# Patient Record
Sex: FEMALE | Race: BLACK OR AFRICAN AMERICAN | URBAN - METROPOLITAN AREA
[De-identification: names, ages, dates, MRNs, and addresses within clinical notes are randomized per-mention and may not be internally consistent; named-entity substitution may affect disease eponyms.]

---

## 2021-04-30 ENCOUNTER — OFFICE VISIT (OUTPATIENT)
Dept: ORTHOPEDIC SURGERY | Age: 36
End: 2021-04-30
Payer: MEDICARE

## 2021-04-30 VITALS — HEIGHT: 68 IN | BODY MASS INDEX: 30.31 KG/M2 | WEIGHT: 200 LBS

## 2021-04-30 DIAGNOSIS — M19.072 ARTHRITIS OF LEFT ANKLE: Primary | ICD-10-CM

## 2021-04-30 DIAGNOSIS — S93.402A SPRAIN OF LEFT ANKLE, UNSPECIFIED LIGAMENT, INITIAL ENCOUNTER: ICD-10-CM

## 2021-04-30 PROCEDURE — 99203 OFFICE O/P NEW LOW 30 MIN: CPT | Performed by: ORTHOPAEDIC SURGERY

## 2021-04-30 PROCEDURE — G8417 CALC BMI ABV UP PARAM F/U: HCPCS | Performed by: ORTHOPAEDIC SURGERY

## 2021-04-30 PROCEDURE — G8510 SCR DEP NEG, NO PLAN REQD: HCPCS | Performed by: ORTHOPAEDIC SURGERY

## 2021-04-30 PROCEDURE — G8427 DOCREV CUR MEDS BY ELIG CLIN: HCPCS | Performed by: ORTHOPAEDIC SURGERY

## 2021-04-30 PROCEDURE — 20606 DRAIN/INJ JOINT/BURSA W/US: CPT | Performed by: ORTHOPAEDIC SURGERY

## 2021-04-30 RX ORDER — LIDOCAINE HYDROCHLORIDE 10 MG/ML
1 INJECTION INFILTRATION; PERINEURAL ONCE
Status: COMPLETED | OUTPATIENT
Start: 2021-04-30 | End: 2021-04-30

## 2021-04-30 RX ORDER — CYANOCOBALAMIN (VITAMIN B-12) 500 MCG
1 TABLET ORAL DAILY
COMMUNITY

## 2021-04-30 RX ORDER — RISPERIDONE 1 MG/1
TABLET, FILM COATED ORAL
COMMUNITY
Start: 2021-04-28

## 2021-04-30 RX ORDER — TRIAMCINOLONE ACETONIDE 40 MG/ML
40 INJECTION, SUSPENSION INTRA-ARTICULAR; INTRAMUSCULAR ONCE
Status: COMPLETED | OUTPATIENT
Start: 2021-04-30 | End: 2021-04-30

## 2021-04-30 RX ORDER — DICLOFENAC SODIUM 10 MG/G
GEL TOPICAL 4 TIMES DAILY
Qty: 100 G | Refills: 5 | Status: SHIPPED | OUTPATIENT
Start: 2021-04-30

## 2021-04-30 RX ORDER — CLONIDINE HYDROCHLORIDE 0.1 MG/1
TABLET ORAL
COMMUNITY
Start: 2021-04-28

## 2021-04-30 RX ADMIN — LIDOCAINE HYDROCHLORIDE 1 ML: 10 INJECTION INFILTRATION; PERINEURAL at 15:34

## 2021-04-30 RX ADMIN — TRIAMCINOLONE ACETONIDE 40 MG: 40 INJECTION, SUSPENSION INTRA-ARTICULAR; INTRAMUSCULAR at 15:35

## 2021-04-30 NOTE — LETTER
Brendon Oh 1985  
[id number]  
 
 
4/30/2021 I hereby authorize and direct Yeison Riggins MD, Jammie Cramer, and whomever he may designate as his associate to perform upon myself the following procedure: 
 
Injection of: Kenalog, Supartz, Euflexxa, Orthovisc in the Right/Left ____________________. If any unforeseen condition arises in the course of the procedure, I further authorize him and his associated and/or assistant(s) to do whatever he/she deems advisable. The nature, purpose, benefits, risks, side effects, likelihood of achieving goals, and potential problems that might occur during recuperation, risks for not receiving the proposed care, treatment and services and alternatives of the procedure have been fully explained to me by my physician including, but not limited to: 
 
Swelling, joint pain, skin pigment changes, worsening of condition, and failure to improve. I acknowledge that no guarantee or assurance has been made to me as to the results that may be obtained or the likelihood of success. _______________________________________ Signature of patient or authorized representative [employer] [facility] fax: [phone]

## 2021-04-30 NOTE — PROGRESS NOTES
Name: William Swain    : 1985     Service Dept: [address] [place] [address] Orthopaedics and Sports Medicine    Patient's Pharmacies:    [id number] [address] AT [place] [address] [place] [address]  Phone: [phone] Fax: [phone]       Chief Complaint   Patient presents with    Ankle Pain      Visit Vitals  Ht 5' 8\" (1.727 m)   Wt 200 lb (90.7 kg)   BMI 30.41 kg/m²      No Known Allergies   Current Outpatient Medications   Medication Sig Dispense Refill    cloNIDine HCL (CATAPRES) 0.1 mg tablet       folic acid 0.8 mg cap Take 1 Tab by mouth daily.  levonorgestreL (MIRENA) 20 mcg/24 hours (6 yrs) 52 mg IUD by IntraUTERine route.  risperiDONE (RisperDAL) 1 mg tablet         There is no problem list on file for this patient. History reviewed. No pertinent family history. Social History     Socioeconomic History    Marital status: UNKNOWN     Spouse name: Not on file    Number of children: Not on file    Years of education: Not on file    Highest education level: Not on file   Tobacco Use    Smoking status: Current Every Day Smoker    Smokeless tobacco: Never Used   Substance and Sexual Activity    Alcohol use: Yes      History reviewed. No pertinent surgical history. History reviewed. No pertinent past medical history. I have reviewed and agree with [address] [place] [address] Nw and ROS and intake form in chart and the record furthermore I have reviewed prior medical record(s) regarding this patients care during this appointment. Review of Systems:   Patient is a pleasant appearing individual, appropriately dressed, well hydrated, well nourished, who is alert, appropriately oriented for age, and in no acute distress with a normal gait and normal affect who does not appear to be in any significant pain.      Physical Exam:  Left Ankle-Point tenderness to palpation lateral aspect on ankle, Decreased range of motion with flexion and extension, No gross instability, Weakness with plantar flexion, No skin abrasions, Positive for swelling, Grossly neurovascularly intact. Right Ankle- No point tenderness, Full range of motion, No instability, No Weakness, No, skin lesions, No swelling, Grossly neurovascularly intact. Procedure Documentation:    I discussed in detail the risks, benefits and complications of an injection which included but are not limited to infection, skin reactions, hot swollen joint, and anaphylaxis with the patient. The patient verbalized understanding and gave informed consent for the injection. The patient's left ankle was prepped using sterile alcohol solution. A sterile needle was inserted into the left ankle and the mixture of 1 mL Lidocaine 1%, 1 mL Kenalog 40 mg was injected under sterile technique. The needle was withdrawn and the puncture site sealed with a Band-Aid. Technique: Under sterile conditions a [employer] ultrasound unit with a variable frequency (7.0-14.0 MHz) linear transducer was used to localize the placement of needle into the left ankle joint. Findings: Successful needle placement for ankle injection. Final images were taken and saved for permanent record. The patient tolerated the injection well. The patient was instructed to call the office immediately if there is any pain, redness, warmth, fever, or chills. Encounter Diagnoses     ICD-10-CM ICD-9-CM   1. Arthritis of left ankle  M19.072 716.97   2. Sprain of left ankle, unspecified ligament, initial encounter  S93.402A 845.00       HPI:  The patient is here with a chief complaint of left ankle pain, dull throbbing pain, progressively getting worse. Pain is 10/10. ROS:  10-point review of systems is unremarkable. X-rays are positive for left ankle osteoarthritis (OA) diagnosed with osteoarthritis (OA). Assessment/Plan:  Plan will be for cortisone injection left ankle. If it helps that's all we need to do.   We will see her back in a week and we will give her Voltaren gel as well. As part of continued conservative pain management options the patient was advised to utilize Tylenol or OTC NSAIDS as long as it is not medically contraindicated. Return to Office: Follow-up and Dispositions    · Return in about 1 week (around 5/7/2021). Scribed by Anuradha Modi MD as dictated by [facility]. Josefina Arellano MD.  Documentation True and Accepted Yeison Arellano MD

## 2021-04-30 NOTE — PATIENT INSTRUCTIONS
Ankle Sprain: Care Instructions  Your Care Instructions     An ankle sprain can happen when you twist your ankle. The ligaments that support the ankle can get stretched and torn. Often the ankle is swollen and painful. Ankle sprains may take from several weeks to several months to heal. Usually, the more pain and swelling you have, the more severe your ankle sprain is and the longer it will take to heal. You can heal faster and regain strength in your ankle with good home treatment. It is very important to give your ankle time to heal completely, so that you do not easily hurt your ankle again. Follow-up care is a key part of your treatment and safety. Be sure to make and go to all appointments, and call your doctor if you are having problems. It's also a good idea to know your test results and keep a list of the medicines you take. How can you care for yourself at home? · Prop up your foot on pillows as much as possible for the next 3 days. Try to keep your ankle above the level of your heart. This will help reduce the swelling. · Follow your doctor's directions for wearing a splint or elastic bandage. Wrapping the ankle may help reduce or prevent swelling. · Your doctor may give you a splint, a brace, an air stirrup, or another form of ankle support to protect your ankle until it is healed. Wear it as directed while your ankle is healing. Do not remove it unless your doctor tells you to. After your ankle has healed, ask your doctor whether you should wear the brace when you exercise. · Put ice or cold packs on your injured ankle for 10 to 20 minutes at a time. Try to do this every 1 to 2 hours for the next 3 days (when you are awake) or until the swelling goes down. Put a thin cloth between the ice and your skin. · You may need to use crutches until you can walk without pain. If you do use crutches, try to bear some weight on your injured ankle if you can do so without pain.  This helps the ankle heal.  · Take pain medicines exactly as directed. ? If the doctor gave you a prescription medicine for pain, take it as prescribed. ? If you are not taking a prescription pain medicine, ask your doctor if you can take an over-the-counter medicine. · If you have been given ankle exercises to do at home, do them exactly as instructed. These can promote healing and help prevent lasting weakness. When should you call for help? Call your doctor now or seek immediate medical care if:    · Your pain is getting worse.     · Your swelling is getting worse.     · Your splint feels too tight or you are unable to loosen it. Watch closely for changes in your health, and be sure to contact your doctor if:    · You are not getting better after 1 week. Where can you learn more? Go to http://www.gray.com/  Enter U718 in the search box to learn more about \"Ankle Sprain: Care Instructions. \"  Current as of: November 16, 2020               Content Version: 12.8  © 2006-2021 Healthwise, Incorporated. Care instructions adapted under license by [employer] (which disclaims liability or warranty for this information). If you have questions about a medical condition or this instruction, always ask your healthcare professional. [place] [address] any warranty or liability for your use of this information.

## 2021-05-28 ENCOUNTER — OFFICE VISIT (OUTPATIENT)
Dept: ORTHOPEDIC SURGERY | Age: 36
End: 2021-05-28
Payer: MEDICARE

## 2021-05-28 DIAGNOSIS — M25.441 SWELLING OF JOINT OF RIGHT HAND: Primary | ICD-10-CM

## 2021-05-28 PROCEDURE — G8417 CALC BMI ABV UP PARAM F/U: HCPCS | Performed by: ORTHOPAEDIC SURGERY

## 2021-05-28 PROCEDURE — G8427 DOCREV CUR MEDS BY ELIG CLIN: HCPCS | Performed by: ORTHOPAEDIC SURGERY

## 2021-05-28 PROCEDURE — G8432 DEP SCR NOT DOC, RNG: HCPCS | Performed by: ORTHOPAEDIC SURGERY

## 2021-05-28 PROCEDURE — 99213 OFFICE O/P EST LOW 20 MIN: CPT | Performed by: ORTHOPAEDIC SURGERY
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
## 2021-05-28 NOTE — PATIENT INSTRUCTIONS

## 2021-09-27 RX ORDER — METRONIDAZOLE 500 MG/1
TABLET ORAL
COMMUNITY
Start: 2021-06-30

## 2021-10-01 ENCOUNTER — OFFICE VISIT (OUTPATIENT)
Dept: ORTHOPEDIC SURGERY | Age: 36
End: 2021-10-01
Payer: MEDICARE

## 2021-10-01 DIAGNOSIS — M19.072 ARTHRITIS OF LEFT ANKLE: ICD-10-CM

## 2021-10-01 DIAGNOSIS — M25.572 LEFT ANKLE PAIN, UNSPECIFIED CHRONICITY: Primary | ICD-10-CM

## 2021-10-01 PROCEDURE — G8427 DOCREV CUR MEDS BY ELIG CLIN: HCPCS | Performed by: ORTHOPAEDIC SURGERY

## 2021-10-01 PROCEDURE — G8432 DEP SCR NOT DOC, RNG: HCPCS | Performed by: ORTHOPAEDIC SURGERY

## 2021-10-01 PROCEDURE — 20606 DRAIN/INJ JOINT/BURSA W/US: CPT | Performed by: ORTHOPAEDIC SURGERY

## 2021-10-01 PROCEDURE — 99214 OFFICE O/P EST MOD 30 MIN: CPT | Performed by: ORTHOPAEDIC SURGERY

## 2021-10-01 PROCEDURE — G8417 CALC BMI ABV UP PARAM F/U: HCPCS | Performed by: ORTHOPAEDIC SURGERY

## 2021-10-01 RX ORDER — LIDOCAINE HYDROCHLORIDE 10 MG/ML
1 INJECTION INFILTRATION; PERINEURAL ONCE
Status: COMPLETED | OUTPATIENT
Start: 2021-10-01 | End: 2021-10-01

## 2021-10-01 RX ORDER — TRIAMCINOLONE ACETONIDE 40 MG/ML
40 INJECTION, SUSPENSION INTRA-ARTICULAR; INTRAMUSCULAR ONCE
Status: COMPLETED | OUTPATIENT
Start: 2021-10-01 | End: 2021-10-01

## 2021-10-01 RX ADMIN — TRIAMCINOLONE ACETONIDE 40 MG: 40 INJECTION, SUSPENSION INTRA-ARTICULAR; INTRAMUSCULAR at 14:00

## 2021-10-01 RX ADMIN — LIDOCAINE HYDROCHLORIDE 1 ML: 10 INJECTION INFILTRATION; PERINEURAL at 14:00

## 2021-10-01 NOTE — PATIENT INSTRUCTIONS
Arthritis: Care Instructions  Overview     Arthritis, also called osteoarthritis, is a breakdown of the cartilage that cushions your joints. When the cartilage wears down, your bones rub against each other. This causes pain and stiffness. Many people have some arthritis as they age. Arthritis most often affects the joints of the spine, hands, hips, knees, or feet. Arthritis never goes away completely. But medicine and home treatment can help reduce pain and help you stay active. Follow-up care is a key part of your treatment and safety. Be sure to make and go to all appointments, and call your doctor if you are having problems. It's also a good idea to know your test results and keep a list of the medicines you take. How can you care for yourself at home? · Stay at a healthy weight. Being overweight puts extra strain on your joints. · Talk to your doctor or physical therapist about exercises that will help ease joint pain. ? Stretch. You may enjoy gentle forms of yoga to help keep your joints and muscles flexible. ? Walk instead of jog. Other types of exercise that are less stressful on the joints include riding a bike, swimming, sanchez chi, or water exercise. ? Lift weights. Strong muscles help reduce stress on your joints. Stronger thigh muscles, for example, take some of the stress off of the knees and hips. Learn the right way to lift weights so you don't make joint pain worse. · Take your medicines exactly as prescribed. Call your doctor if you think you are having a problem with your medicine. · Take pain medicines exactly as directed. ? If the doctor gave you a prescription medicine for pain, take it as prescribed. ? If you are not taking a prescription pain medicine, ask your doctor if you can take an over-the-counter medicine. · Use a cane, crutch, walker, or another device if you need help to get around. These can help rest your joints.  You also can use other things to make life easier, such as a higher toilet seat and padded handles on kitchen utensils. · Do not sit in low chairs. They can make it hard to get up. · Put heat or cold on your sore joints as needed. Use whichever helps you most. You can also switch between hot and cold packs. ? Apply heat 2 or 3 times a day for 20 to 30 minutes--using a heating pad, hot shower, or hot pack--to relieve pain and stiffness. But don't use heat on a swollen joint. ? Put ice or a cold pack on your sore joint for 10 to 20 minutes at a time. Put a thin cloth between the ice and your skin. When should you call for help? Call your doctor now or seek immediate medical care if:    · You have sudden swelling, warmth, or pain in any joint.     · You have joint pain and a fever or rash.     · You have such bad pain that you cannot use a joint. Watch closely for changes in your health, and be sure to contact your doctor if:    · You have mild joint symptoms that continue even with more than 6 weeks of care at home.     · You have stomach pain or other problems with your medicine. Where can you learn more? Go to http://www.gray.com/  Enter D853 in the search box to learn more about \"Arthritis: Care Instructions. \"  Current as of: April 30, 2021               Content Version: 13.0  © 0392-6584 Healthwise, Incorporated. Care instructions adapted under license by [employer] (which disclaims liability or warranty for this information). If you have questions about a medical condition or this instruction, always ask your healthcare professional. [place] [address] any warranty or liability for your use of this information.

## 2021-10-01 NOTE — LETTER
Carol Severe   1985   [id number]       10/1/2021       I hereby authorize and direct Yeison Viveros MD, Jennifer Voss, and whomever he may designate as his associate to perform upon myself the following procedure:    Injection of: Kenalog, Supartz, Euflexxa, Orthovisc in the Right/Left ____________________. If any unforeseen condition arises in the course of the procedure, I further authorize him and his associated and/or assistant(s) to do whatever he/she deems advisable. The nature, purpose, benefits, risks, side effects, likelihood of achieving goals, and potential problems that might occur during recuperation, risks for not receiving the proposed care, treatment and services and alternatives of the procedure have been fully explained to me by my physician including, but not limited to:    Swelling, joint pain, skin pigment changes, worsening of condition, and failure to improve. I acknowledge that no guarantee or assurance has been made to me as to the results that may be obtained or the likelihood of success.                 _______________________________________     Signature of patient or authorized representative                [employer] [facility] fax: [phone]

## 2021-10-01 NOTE — LETTER
10/4/2021    Patient: Margi Duff   YOB: 1985   Date of Visit: 10/1/2021     Veronika Winn MD  [address]  [address] Fax: [phone]    Dear Veronika Winn MD,      Thank you for referring Ms. Margi Duff to [address] [place] [address] [facility] for evaluation. My notes for this consultation are attached. If you have questions, please do not hesitate to call me. I look forward to following your patient along with you.       Sincerely,    Jorge Sutton MD

## 2021-10-01 NOTE — PROGRESS NOTES
Name: Macria Mohamud    : 1985     Service Dept: [address] [place] [address] and Sports Medicine    Patient's Pharmacies:    [place] [address] [id number] [place] [address] [place] [address]  Phone: [phone] Fax: [phone]       Chief Complaint   Patient presents with    Ankle Pain        There were no vitals taken for this visit. No Known Allergies   Current Outpatient Medications   Medication Sig Dispense Refill    metroNIDAZOLE (FLAGYL) 500 mg tablet TAKE 1 TABLET BY MOUTH EVERY 12 HOURS FOR 6 DAYS      cloNIDine HCL (CATAPRES) 0.1 mg tablet       folic acid 0.8 mg cap Take 1 Tab by mouth daily.  levonorgestreL (MIRENA) 20 mcg/24 hours (6 yrs) 52 mg IUD by IntraUTERine route.  risperiDONE (RisperDAL) 1 mg tablet       diclofenac (VOLTAREN) 1 % gel Apply  to affected area four (4) times daily. 100 g 5     Current Facility-Administered Medications   Medication Dose Route Frequency Provider Last Rate Last Admin    [COMPLETED] triamcinolone acetonide (KENALOG-40) 40 mg/mL injection 40 mg  40 mg Other ONCE Yeison López MD   40 mg at 10/01/21 1400    [COMPLETED] lidocaine (XYLOCAINE) 10 mg/mL (1 %) injection 1 mL  1 mL Other ONCE Yeison López MD   1 mL at 10/01/21 1400      There is no problem list on file for this patient.      Family History   Problem Relation Age of Onset    No Known Problems Mother     No Known Problems Father       Social History     Socioeconomic History    Marital status: UNKNOWN     Spouse name: Not on file    Number of children: Not on file    Years of education: Not on file    Highest education level: Not on file   Tobacco Use    Smoking status: Current Every Day Smoker    Smokeless tobacco: Never Used   Substance and Sexual Activity    Alcohol use: Yes     Social Determinants of Health     Financial Resource Strain:     Difficulty of Paying Living Expenses: Food Insecurity:     Worried About Running Out of Food in the Last Year:     [address] in the Last Year:    Transportation Needs:     Lack of Transportation (Medical):  Lack of Transportation (Non-Medical):    Physical Activity:     Days of Exercise per Week:     Minutes of Exercise per Session:    Stress:     Feeling of Stress :    Social Connections:     Frequency of Communication with Friends and Family:     Frequency of Social Gatherings with Friends and Family:     Attends [employer] Services:     Active Member of Clubs or Organizations:     Attends Club or Organization Meetings:     Marital Status:       History reviewed. No pertinent surgical history. History reviewed. No pertinent past medical history. I have reviewed and agree with [address] [place] [address] Nw and ROS and intake form in chart and the record furthermore I have reviewed prior medical record(s) regarding this patients care during this appointment. Review of Systems:   Patient is a pleasant appearing individual, appropriately dressed, well hydrated, well nourished, who is alert, appropriately oriented for age, and in no acute distress with a normal gait and normal affect who does not appear to be in any significant pain. Physical Exam:  Left Ankle- Grossly neurovascularly intact with good cap refill, decreased range of motion, decreased strength, positive crepitation, minimal swelling, skin intact with no skin lesions, no erythema, no echymosis, no point tenderness. Right Ankle - No point tenderness, Full range of motion, No instability, No Weakness, No, skin lesions, No swelling, No instability, Grossly neurovascularly intact. Procedure Documentation:    I discussed in detail the risks, benefits and complications of an injection which included but are not limited to infection, skin reactions, hot swollen joint, and anaphylaxis with the patient. The patient verbalized understanding and gave informed consent for the injection. The patient's left ankle was prepped using sterile alcohol solution. A sterile needle was inserted into the left ankle and the mixture of 1 mL Lidocaine 1%, 1 mL Kenalog 40 mg was injected under sterile technique. The needle was withdrawn and the puncture site sealed with a Band-Aid. Technique: Under sterile conditions a [employer] ultrasound unit with a variable frequency (7.0-14.0 MHz) linear transducer was used to localize the placement of needle into the left ankle joint. Findings: Successful needle placement for ankle injection. Final images were taken and saved for permanent record. The patient tolerated the injection well. The patient was instructed to call the office immediately if there is any pain, redness, warmth, fever, or chills. Encounter Diagnoses     ICD-10-CM ICD-9-CM   1. Left ankle pain, unspecified chronicity  M25.572 719.47   2. Arthritis of left ankle  M19.072 716.97       HPI:  The patient is here with a chief complaint of left ankle pain, diagnosed with osteoarthritis, post injection in the past but nothing recently. Pain is 9/10. Continues to have difficulty. Assessment/Plan:  1. Left ankle arthritis that is not getting better. Plan will be for cortisone injection to the left ankle. If it helps, that is all we need to do. As part of continued conservative pain management options the patient was advised to utilize Tylenol or OTC NSAIDS as long as it is not medically contraindicated. Return to Office: Follow-up and Dispositions    · Return in about 4 weeks (around 10/29/2021).         Administrations This Visit     lidocaine (XYLOCAINE) 10 mg/mL (1 %) injection 1 mL     Admin Date  10/01/2021 Action  Given Dose  1 mL Route  Other Administered By  Emily Onofre LPN          triamcinolone acetonide (KENALOG-40) 40 mg/mL injection 40 mg     Admin Date  10/01/2021 Action  Given Dose  40 mg Route  Other Administered By  Emily Onofre LPN               Scribed by Lieutenant Mari LPN as dictated by Thomas Mccann. Timothy Topete MD.  Documentation True and Accepted Yeison Topete MD

## 2021-11-05 ENCOUNTER — OFFICE VISIT (OUTPATIENT)
Dept: ORTHOPEDIC SURGERY | Age: 36
End: 2021-11-05
Payer: MEDICARE

## 2021-11-05 DIAGNOSIS — M25.572 LEFT ANKLE PAIN, UNSPECIFIED CHRONICITY: Primary | ICD-10-CM

## 2021-11-05 PROCEDURE — G8432 DEP SCR NOT DOC, RNG: HCPCS | Performed by: ORTHOPAEDIC SURGERY

## 2021-11-05 PROCEDURE — 99213 OFFICE O/P EST LOW 20 MIN: CPT | Performed by: ORTHOPAEDIC SURGERY

## 2021-11-05 PROCEDURE — G8417 CALC BMI ABV UP PARAM F/U: HCPCS | Performed by: ORTHOPAEDIC SURGERY

## 2021-11-05 PROCEDURE — G8427 DOCREV CUR MEDS BY ELIG CLIN: HCPCS | Performed by: ORTHOPAEDIC SURGERY

## 2021-11-05 NOTE — LETTER
11/8/2021    Patient: Nancy Ambriz   YOB: 1985   Date of Visit: 11/5/2021     Tiffanie Alatorre MD  [address]  V[address] Fax: [phone]    Dear Tiffanie Alatorre MD,      Thank you for referring Ms. Nancy Ambriz to [address] [place] [address] for evaluation. My notes for this consultation are attached. If you have questions, please do not hesitate to call me. I look forward to following your patient along with you.       Sincerely,    Clyde Hutton MD

## 2021-11-05 NOTE — PROGRESS NOTES
Name: Gifty Robb    : 1985     Service Dept: [address] [place] [address] and Sports Medicine    Patient's Pharmacies:    [id number] - [address] AT [place] [address] [place] [address]  Phone: [phone] Fax: [phone]       Chief Complaint   Patient presents with    Ankle Pain        There were no vitals taken for this visit. No Known Allergies   Current Outpatient Medications   Medication Sig Dispense Refill    metroNIDAZOLE (FLAGYL) 500 mg tablet TAKE 1 TABLET BY MOUTH EVERY 12 HOURS FOR 6 DAYS      cloNIDine HCL (CATAPRES) 0.1 mg tablet       folic acid 0.8 mg cap Take 1 Tab by mouth daily.  levonorgestreL (MIRENA) 20 mcg/24 hours (6 yrs) 52 mg IUD by IntraUTERine route.  risperiDONE (RisperDAL) 1 mg tablet       diclofenac (VOLTAREN) 1 % gel Apply  to affected area four (4) times daily. 100 g 5      There is no problem list on file for this patient. Family History   Problem Relation Age of Onset    No Known Problems Mother     No Known Problems Father       Social History     Socioeconomic History    Marital status: UNKNOWN   Tobacco Use    Smoking status: Current Every Day Smoker    Smokeless tobacco: Never Used   Substance and Sexual Activity    Alcohol use: Yes      History reviewed. No pertinent surgical history. History reviewed. No pertinent past medical history. I have reviewed and agree with [address] [facility] [address] and ROS and intake form in chart and the record furthermore I have reviewed prior medical record(s) regarding this patients care during this appointment. Review of Systems:   Patient is a pleasant appearing individual, appropriately dressed, well hydrated, well nourished, who is alert, appropriately oriented for age, and in no acute distress with a normal gait and normal affect who does not appear to be in any significant pain.    Physical Exam:  Left Ankle- Grossly neurovascularly intact with good cap refill, decreased range of motion, decreased strength, positive crepitation, minimal swelling, skin intact with no skin lesions, no erythema, no echymosis, no point tenderness. Right Ankle - No point tenderness, Full range of motion, No instability, No Weakness, No, skin lesions, No swelling, No instability, Grossly neurovascularly intact. Encounter Diagnoses     ICD-10-CM ICD-9-CM   1. Left ankle pain, unspecified chronicity  M25.572 719.47       HPI:  The patient is here with a chief complaint of left tibial harshil placement, diagnosed with osteoarthritis, progressively getting worse. Pain is 9/10. Assessment/Plan:  Plan at this point, my recommendation would be for foot and ankle specialist for ankle OA. We will see the patient back as needed and go from there. No restrictions in the meantime. As part of continued conservative pain management options the patient was advised to utilize Tylenol or OTC NSAIDS as long as it is not medically contraindicated. Return to Office: Follow-up and Dispositions    · Return if symptoms worsen or fail to improve. Scribed by Tiffany Rodríguez LPN as dictated by [facility] RADHA Shah MD.  Documentation True and Accepted Yeison Shah MD

## 2021-11-05 NOTE — PATIENT INSTRUCTIONS
Ankle: Exercises  Introduction  Here are some examples of exercises for you to try. The exercises may be suggested for a condition or for rehabilitation. Start each exercise slowly. Ease off the exercises if you start to have pain. You will be told when to start these exercises and which ones will work best for you. How to do the exercises  'Alphabet' exercise    1. Trace the alphabet with your toe. This helps your ankle move in all directions. Side-to-side knee swing exercise    1. Sit in a chair with your foot flat on the floor. 2. Slowly move your knee from side to side while keeping your foot pressed flat. 3. Continue this exercise for 2 to 3 minutes. Towel curl    1. While sitting, place your foot on a towel on the floor and scrunch the towel toward you with your toes. 2. Then use your toes to push the towel away from you. 3. Make this exercise more challenging by placing a weighted object, such as a soup can, on the other end of the towel. Towel stretch    1. Sit with your legs extended and knees straight. 2. Place a towel around your foot just under the toes. 3. Hold each end of the towel in each hand, with your hands above your knees. 4. Pull back with the towel so that your foot stretches toward you. 5. Hold the position for at least 15 to 30 seconds. 6. Repeat 2 to 4 times a session, up to 5 sessions a day. Ankle eversion exercise    1. Start by sitting with your foot flat on the floor and pushing it outward against an immovable object such as the wall or heavy furniture. Hold for about 6 seconds, then relax. Repeat 8 to 12 times. 2. After you feel comfortable with this, try using rubber tubing looped around the outside of your feet for resistance. Push your foot out to the side against the tubing, and then count to 10 as you slowly bring your foot back to the middle. Repeat 8 to 12 times. Isometric opposition exercises    1.  While sitting, put your feet together flat on the floor.  2. Press your injured foot inward against your other foot. Hold for about 6 seconds, and relax. Repeat 8 to 12 times. 3. Then place the heel of your other foot on top of the injured one. Push down with the top heel while trying to push up with your injured foot. Hold for about 6 seconds, and relax. Repeat 8 to 12 times. Follow-up care is a key part of your treatment and safety. Be sure to make and go to all appointments, and call your doctor if you are having problems. It's also a good idea to know your test results and keep a list of the medicines you take. Where can you learn more? Go to http://www.gray.com/  Enter R730 in the search box to learn more about \"Ankle: Exercises. \"  Current as of: July 1, 2021               Content Version: 13.0  © 4400-9574 [employer]. Care instructions adapted under license by [employer] (which disclaims liability or warranty for this information). If you have questions about a medical condition or this instruction, always ask your healthcare professional. [address] any warranty or liability for your use of this information.

## 2021-11-29 ENCOUNTER — OFFICE VISIT (OUTPATIENT)
Dept: ORTHOPEDIC SURGERY | Age: 36
End: 2021-11-29
Payer: MEDICARE

## 2021-11-29 VITALS — TEMPERATURE: 97.6 F | BODY MASS INDEX: 30.41 KG/M2 | WEIGHT: 200 LBS

## 2021-11-29 DIAGNOSIS — M19.172 POST-TRAUMATIC OSTEOARTHRITIS OF LEFT ANKLE: Primary | ICD-10-CM

## 2021-11-29 DIAGNOSIS — M25.572 CHRONIC PAIN OF LEFT ANKLE: ICD-10-CM

## 2021-11-29 DIAGNOSIS — G89.29 CHRONIC PAIN OF LEFT ANKLE: ICD-10-CM

## 2021-11-29 PROCEDURE — 99214 OFFICE O/P EST MOD 30 MIN: CPT | Performed by: ORTHOPAEDIC SURGERY

## 2021-11-29 PROCEDURE — G8417 CALC BMI ABV UP PARAM F/U: HCPCS | Performed by: ORTHOPAEDIC SURGERY

## 2021-11-29 PROCEDURE — G8432 DEP SCR NOT DOC, RNG: HCPCS | Performed by: ORTHOPAEDIC SURGERY

## 2021-11-29 PROCEDURE — 73590 X-RAY EXAM OF LOWER LEG: CPT | Performed by: ORTHOPAEDIC SURGERY

## 2021-11-29 PROCEDURE — G8427 DOCREV CUR MEDS BY ELIG CLIN: HCPCS | Performed by: ORTHOPAEDIC SURGERY

## 2021-11-29 RX ORDER — MELOXICAM 15 MG/1
15 TABLET ORAL DAILY
Qty: 30 TABLET | Refills: 1 | Status: SHIPPED | OUTPATIENT
Start: 2021-11-29 | End: 2022-01-01 | Stop reason: SDUPTHER

## 2021-11-29 RX ORDER — FAMOTIDINE 40 MG/1
40 TABLET, FILM COATED ORAL DAILY
Qty: 30 TABLET | Refills: 1 | Status: SHIPPED | OUTPATIENT
Start: 2021-11-29 | End: 2022-01-01 | Stop reason: SDUPTHER

## 2021-11-29 NOTE — PROGRESS NOTES
AMBULATORY PROGRESS NOTE      Patient: Sachin Hernandez             MRN: [id number]     SSN: [id number] Body mass index is 30.41 kg/m². YOB: 1985     AGE: 39 y.o. EX: female    PCP: Carri Blanton MD       IMPRESSION //  DIAGNOSIS AND TREATMENT PLAN        Sachin Hernandez has a diagnosis of:      She mentions having tenderness the medial ankle lateral ankle and proximal medial tibia region: There is no signs infection to this left lower extremity there is no gross instability left ankle, recommendation comes really posted orthotic to support the medial column of her hindfoot. I have her sign a medical release form to the physicians that were treating her, so I can decipher what else had been done to her. As she had intramedullary rodding of her left tibia and ORIF of the of the distal tibia as well with cannulated screws. She insists that the screws are in the wrong position and they are \"floating around\". No surgical intervention, this individual, at this current time. She had a cortisone injection, I believe by Dr. Vivian Mahan of [facility]// I do not think she needs any additional cortisone injections at this time. DIAGNOSES    1. Post-traumatic osteoarthritis of left ankle    2. Chronic pain of left ankle        Orders Placed This Encounter    Generic Supply Order     Custom Left SMO Medially posted    Generic Supply Order     Left AS/AC brace will be given and placed on patient    [[id number]] Tib/Fib 2V     Order Specific Question:   Reason for Exam     Answer:   pain     Order Specific Question:   Is Patient Pregnant? Answer:   Unknown    meloxicam (MOBIC) 15 mg tablet     Sig: Take 1 Tablet by mouth daily. Dispense:  30 Tablet     Refill:  1    famotidine (PEPCID) 40 mg tablet     Sig: Take 1 Tablet by mouth daily. Dispense:  30 Tablet     Refill:  1            PLAN:    1. Medical release form, to her treating physicians  2.   Medially posted, left SMO brace will be recommended to take some pressure off the medial portion of her left hindfoot: She understands insurance does not always cover the cost of this. Dacia Louis MD has ordered a custom brace or DME product for Noemi Perez . This will be customized and made for you by an outside facility. I am requesting that you contact the Orthotist company provided below in order to have the prescription made.  PROSTHETICS AND ORTHOTICS      Noemi Perez is in need of CUSTOM   Left     1. Unilateral,  , brace deviceorthoticleft newly posted SMO Bilateral:      2. GOAL OF TREATMENT TO: to provide M/L stability, optimal arch support, and limit ML/AP motion. Noemi Perez needs tri planar control (Medial / Lateral stability, optimal arch support, and limited Medial/Lateral // Anterior/Posterior Motion) of the foot and ankle in order to improve anatomical alignment, protect/control motion, and to relieve pain. 3. [facility] devices (ankle braces, over-the-counter ankle splint supports,) and each of these has been either ill fitting, poorly tolerated, provided incomplete support, provided insufficient  pain relief. Please look favorably upon Noemi Perez and please assist in covering the financial expense of the custom DME. Dacia Louis MD  11/29/2021  12:14 PM        RTO 4 to 6 weeks    There are no Patient Instructions on file for this visit. Please follow up with your PCP for any health maintenance as recommended         Noemi Perez  expresses understanding of the diagnosis, treatment plan, and all of their proposed questions were answered to their satisfaction. Patient education has been provided re the diagnoses.          \[facility] [place] [facility]\"" //  Kathryn Andrés IS A 39 y.o. female who is a/an  established patient, presenting to my outpatient office for evaluation of  the following chief complaint(s): Chief Complaint   Patient presents with    Ankle Pain     left        Justino Kruse been under the care of Dr. Daphne Galvin for which his first evaluation was on April 30, 2021 for which she presented with a chief complaint of left ankle pain and on his evaluation she had some tenderness to the lateral aspect of her ankle. A cortisone injection was performed by him, on that April 30, 2021 office visit date and he obtain x-rays of her ankle as well to confirm some osteoarthritis of her left ankle joint. On October 1, 2021, she presented again, for follow-up care, of her left ankle, and her evaluation at that time revealed decreased range of motion, decreased strength, positive crepitation minimal swelling, again a diagnosis of osteoarthritis of her left ankle. A cortisone injection, was performed on her left ankle, on that October 1, 2021 visit.     She had x-rays, and the [facility] network system, on April 30, 2021. These were x-rays of her left tibia and fibula. The impression was healed distal tibia and fibula diaphyseal fracture deformities with ORIF hardware in place. No acute radiographic abnormality. There is some mild degenerative subchondral cystic formation at the distal roof of the tibial plafond. Mild medial tibiofemoral compartment degenerative marginal osteophytic formation with preserved joint spaces however. Present today, complain of pain, to the lateral part of her ankle, and medial part of her ankle. The pain starts the medial part of her ankle, shoots up to the medial part of her knee. She tells me that and shows me x-rays that she had in the past.  She tells me that she has had at least 2 surgeries. They are done at [facility] she tells me that her case is currently under litigation, and she has a  and she spoke with a  this morning. She tells me that she had 2 surgeries at [facility].   1 surgery with a but the rodding, that she tells me another surgery where she had additional fixation of her ankle. She tells me that the distal screws, she points to her x-ray shows me that but that the distal screws in her tibia, are in the wrong position and they should be through this central hole of the intramedullary harshil. I have not reviewed any medical records, from [facility].  She needs to sign a medical release form for me to the look at the data from any other physician that she had seen in the past.      Visit Vitals  Temp 97.6 °F (36.4 °C)   Wt 200 lb (90.7 kg)   BMI 30.41 kg/m²       Appearance: Alert, well appearing and pleasant patient who is in no distress, oriented to person, place/time, and who follows commands. Normal dress/motor activity/thought processes/memory. This patient is accompanied in the examination room by her  self. Patient arrives to office via: without assistive device:      Psychiatric:  Normal Affect/mood. Judgement, behavior, and conduct are appropriate. Speech normal in context and clarity, memory intact grossly, no involuntary movements - tremors. H EENT (2): Head normocephalic & atraumatic. Eye: pupils are round// EOM are intact // Neck: ROM WNL  // Hearings Intact   Respiratory: Breathing non labored     ANKLE/FOOT left    Gait: normal  Tenderness: mild medial malleolus distal tip, mild, slight to the anterolateral gutter of the left ankle. Nontender to the proximal fibula. Negative external rotation stress test  Cutaneous:   WNL. Joint Motion:   WNL   Joint / Tendon Stability:  No Ankle or Subtalar instability or joint laxity. No peroneal sublux ability or dislocation  Alignment: neutral Hindfoot,    Neuro Motor/Sensory: NL/NL  Vascular: NL foot/ankle pulses,   Lymphatics: No extremity lymphedema, No calf swelling, no tenderness to calf muscles.           CHART REVIEW     Keo Joiner has been experiencing pain and discomfort confirmed as outlined in the pain assessment outlined below.     was reviewed by John Berg MD on 11/29/2021. Pain Assessment  11/29/2021   Location of Pain Ankle   Location Modifiers Left   Severity of Pain 10   Quality of Pain Dull;Aching   Frequency of Pain Constant   Aggravating Factors Stairs; Walking;Standing;Squatting   Limiting Behavior Yes   Relieving Factors Other (Comment)   Result of Injury Yes        Ariana Lomeli  has no past medical history on file. Patients is employed at:         History reviewed. No pertinent past medical history. Past Surgical History:   Procedure Laterality Date    HX ANKLE FRACTURE TX       Current Outpatient Medications   Medication Sig    meloxicam (MOBIC) 15 mg tablet Take 1 Tablet by mouth daily.  famotidine (PEPCID) 40 mg tablet Take 1 Tablet by mouth daily.  metroNIDAZOLE (FLAGYL) 500 mg tablet TAKE 1 TABLET BY MOUTH EVERY 12 HOURS FOR 6 DAYS    cloNIDine HCL (CATAPRES) 0.1 mg tablet     folic acid 0.8 mg cap Take 1 Tab by mouth daily.  levonorgestreL (MIRENA) 20 mcg/24 hours (6 yrs) 52 mg IUD by IntraUTERine route.  risperiDONE (RisperDAL) 1 mg tablet     diclofenac (VOLTAREN) 1 % gel Apply  to affected area four (4) times daily. No current facility-administered medications for this visit. No Known Allergies  Social History     Occupational History    Not on file   Tobacco Use    Smoking status: Current Every Day Smoker    Smokeless tobacco: Never Used   Substance and Sexual Activity    Alcohol use: Yes    Drug use: Not on file    Sexual activity: Not on file     Family History   Problem Relation Age of Onset    No Known Problems Mother     No Known Problems Father         DIAGNOSTIC LAB DATA      No results found for: HBA1C, QNY0UHII, MHJ3JUVU // No results found for: GLU, GLUCPOC     No results found for: IJM7KESQ, LAO8EGYM      No results found for: VITD3, XQVID2, XQVID3, XQVID, VD3RIA, [id number]     Drug Screen Most Recent Result Date    No resulted procedures found. REVIEW OF SYSTEMS : 11/29/2021  ALL BELOW ARE Negative except : SEE HPI     All other systems reviewed and are negative. 12 point review of systems otherwise negative unless noted in HPI. RADIOGRAPHS// IMAGING//DIAGNOSTIC DATA     Orders Placed This Encounter    Generic Supply Order    Generic Supply Order    [[id number]] Tib/Fib 2V    meloxicam (MOBIC) 15 mg tablet    famotidine (PEPCID) 40 mg tablet        X-rays of the tib-fib, left tib-fib, full-length: 2 views: AP, lateral: Shows a well-healed distal one third tibia fracture, with some OA changes seen across ankle. There is intramedullary harshil, there are also 2 screws distally, that traverse the distal tibia. Ankle joint space is slightly narrowed, these are nonweightbearing x-rays. Otherwise I see no osteolytic or osteoblastic lesions are seen to the ankle. The subtalar joint and transverse tarsal joints are well-maintained, on these images. I have personally reviewed the images of the above study. The interpretation of this study is my professional opinion            I have spent 35 minutes reviewing the previous notes, reviewing diagnostic studies [Advanced  Imaging, Diagnostic test results (x-rays)] and had a direct face to face with the patient discussing the diagnosis and importance of compliance with the treatment and plan. There is  discussion for the potential for surgery, answering all questions, as well as documenting patient care coordination for this individual on the day of the visit. Disclaimer:     Sections of this note are dictated using utilizing voice recognition software, which may have resulted in some phonetic based errors in grammar and contents. Even though attempts were made to correct all the mistakes, some may have been missed, and remained in the body of the document. If questions arise, please contact our department. An electronic signature was used to authenticate this note.     Sachin heredia have a reminder for a \"due or due soon\" health maintenance. I have asked that she contact her primary care provider for follow-up on this health maintenance. There are no Patient Instructions on file for this visit. Please follow up with your PCP for any health maintenance as recommended.                 Wyatt James MD  11/29/2021  7:49 AM

## 2021-11-30 ENCOUNTER — TELEPHONE (OUTPATIENT)
Dept: ORTHOPEDIC SURGERY | Age: 36
End: 2021-11-30

## 2021-11-30 DIAGNOSIS — M25.572 CHRONIC PAIN OF LEFT ANKLE: ICD-10-CM

## 2021-11-30 DIAGNOSIS — M19.172 POST-TRAUMATIC OSTEOARTHRITIS OF LEFT ANKLE: ICD-10-CM

## 2021-11-30 DIAGNOSIS — G89.29 CHRONIC PAIN OF LEFT ANKLE: ICD-10-CM

## 2021-11-30 RX ORDER — FAMOTIDINE 40 MG/1
40 TABLET, FILM COATED ORAL DAILY
Qty: 30 TABLET | Refills: 1 | Status: CANCELLED | OUTPATIENT
Start: 2021-11-30

## 2021-11-30 RX ORDER — MELOXICAM 15 MG/1
15 TABLET ORAL DAILY
Qty: 30 TABLET | Refills: 1 | Status: CANCELLED | OUTPATIENT
Start: 2021-11-30

## 2021-11-30 NOTE — TELEPHONE ENCOUNTER
Patient is requesting we resend the scripts for Mobic and Pepcid to the [facility] at [facility] and [facility] Mendieta in [facility] please.      Patient [phone]

## 2022-01-01 DIAGNOSIS — M19.172 POST-TRAUMATIC OSTEOARTHRITIS OF LEFT ANKLE: ICD-10-CM

## 2022-01-01 DIAGNOSIS — M25.572 CHRONIC PAIN OF LEFT ANKLE: ICD-10-CM

## 2022-01-01 DIAGNOSIS — G89.29 CHRONIC PAIN OF LEFT ANKLE: ICD-10-CM

## 2022-01-01 RX ORDER — FAMOTIDINE 40 MG/1
40 TABLET, FILM COATED ORAL DAILY
Qty: 90 TABLET | Refills: 2 | Status: SHIPPED | OUTPATIENT
Start: 2022-01-01

## 2022-01-01 RX ORDER — MELOXICAM 15 MG/1
15 TABLET ORAL DAILY
Qty: 30 TABLET | Refills: 2 | Status: SHIPPED | OUTPATIENT
Start: 2022-01-01 | End: 2022-01-31

## 2022-01-19 NOTE — PROGRESS NOTES
AMBULATORY PROGRESS NOTE      Patient: Ashlie cMneil             MRN: [id number]     SSN: [id number] Body mass index is 43.94 kg/m². YOB: 1985     AGE: 44 y.o. EX: female    PCP: Missy Overton MD       IMPRESSION //  DIAGNOSIS AND TREATMENT PLAN        Ashlie Mcneil has a diagnosis of:      DIAGNOSES    1. Post-traumatic osteoarthritis of left ankle        Orders Placed This Encounter    meloxicam (MOBIC) 15 mg tablet     Sig: Take 1 Tablet by mouth daily. Dispense:  90 Tablet     Refill:  0            PLAN:    1.  2-month follow-up  2. Activities as tolerated     RTO 2 months    There are no Patient Instructions on file for this visit. Please follow up with your PCP for any health maintenance as recommended         Ashlie Mcneil  expresses understanding of the diagnosis, treatment plan, and all of their proposed questions were answered to their satisfaction. Patient education has been provided re the diagnoses. HPI //  Kathryn 634 IS A 44 y.o. female who is a/an  established patient, presenting to my outpatient office for evaluation of  the following chief complaint(s):     Chief Complaint   Patient presents with    Knee Pain     Left knee pain    Ankle Pain     Left ankle pain        At [address] [facility] presented w/ post-traumatic osteoarthritis of left ankle. Medical release form, to her treating physicians. Medially posted, left SMO brace will be recommended to take some pressure off the medial. Order 2-View XR of left  Tib/Fib. Since LOV. She states she is doing a bit better, taking meloxicam for her pain. She still reports occasional sharp pain to the galea anterior portion of her left ankle and has some discomfort when she stands and walks for prolonged periods of time. Denies any additional history of trauma. She feels of \"the bones are shorter in her left foot and ankle, compared to the right\".     My last visit, my recommendation was for: A medially posted orthotic orthotic to support the medial column of her hindfoot. I recommended that she: sign a medical release form to the physicians that were treating her, so I can decipher what else had been done to her. As she had intramedullary rodding of her left tibia and ORIF of the of the distal tibia as well with cannulated screws. She insists that the screws are in the wrong position and they are \"floating around\". Not recommend any surgical intervention, this individual, at this/that current time. Visit Vitals  Temp 97.7 °F (36.5 °C) (Temporal)   Ht 5' 8\" (1.727 m)   Wt 289 lb (131.1 kg)   BMI 43.94 kg/m²       Appearance: Alert, well appearing and pleasant patient who is in no distress, oriented to person, place/time, and who follows commands. Normal dress/motor activity/thought processes/memory. This patient is accompanied in the examination room by her  self. Patient arrives to office via: without assistive device:      Psychiatric:  Normal Affect/mood. Judgement, behavior, and conduct are appropriate. Speech normal in context and clarity, memory intact grossly, no involuntary movements - tremors. H EENT (2): Head normocephalic & atraumatic. Eye: pupils are round// EOM are intact // Neck: ROM WNL  // Hearings Intact   Respiratory: Breathing non labored     ANKLE/FOOT left    Gait: normal  Tenderness: no    Cutaneous:    WNL. Joint Motion:   WNL   Joint / Tendon Stability:  No Ankle or Subtalar instability or joint laxity. No peroneal sublux ability or dislocation  Alignment: neutral Hindfoot,    Neuro Motor/Sensory: NL/NL  Vascular: NL foot/ankle pulses,   Lymphatics: No extremity lymphedema, No calf swelling, no tenderness to calf muscles. CHART REVIEW     Marco A Jenkins has been experiencing pain and discomfort confirmed as outlined in the pain assessment outlined below.      was reviewed Juliane Farris MD on 1/24/2022. Pain Assessment  11/29/2021   Location of Pain Ankle   Location Modifiers Left   Severity of Pain 10   Quality of Pain Dull;Aching   Frequency of Pain Constant   Aggravating Factors Stairs; Walking;Standing;Squatting   Limiting Behavior Yes   Relieving Factors Other (Comment)   Result of Injury Yes        Nargis Hogue  has no past medical history on file. Patients is employed at:         History reviewed. No pertinent past medical history. Past Surgical History:   Procedure Laterality Date    HX ANKLE FRACTURE TX       Current Outpatient Medications   Medication Sig    meloxicam (MOBIC) 15 mg tablet Take 1 Tablet by mouth daily.  meloxicam (MOBIC) 15 mg tablet TAKE 1 TABLET BY MOUTH DAILY    folic acid 0.8 mg cap Take 1 Tab by mouth daily.  levonorgestreL (MIRENA) 20 mcg/24 hours (6 yrs) 52 mg IUD by IntraUTERine route.  famotidine (PEPCID) 40 mg tablet TAKE 1 TABLET BY MOUTH DAILY (Patient not taking: Reported on 1/24/2022)    metroNIDAZOLE (FLAGYL) 500 mg tablet TAKE 1 TABLET BY MOUTH EVERY 12 HOURS FOR 6 DAYS (Patient not taking: Reported on 1/24/2022)    cloNIDine HCL (CATAPRES) 0.1 mg tablet  (Patient not taking: Reported on 1/24/2022)    risperiDONE (RisperDAL) 1 mg tablet  (Patient not taking: Reported on 1/24/2022)    diclofenac (VOLTAREN) 1 % gel Apply  to affected area four (4) times daily. (Patient not taking: Reported on 1/24/2022)     No current facility-administered medications for this visit. No Known Allergies  Social History     Occupational History    Not on file   Tobacco Use    Smoking status: Current Some Day Smoker    Smokeless tobacco: Never Used   Substance and Sexual Activity    Alcohol use:  Yes    Drug use: Not on file    Sexual activity: Not on file     Family History   Problem Relation Age of Onset    No Known Problems Mother     No Known Problems Father         DIAGNOSTIC LAB DATA      No results found for: HBA1C, QII1QAFC, QAX1AYGW // No results found for: GLU, GLUCPOC     No results found for: FUA7IRNW, PVI0NWEH      No results found for: VITD3, XQVID2, XQVID3, XQVID, VD3RIA, [id number]     Drug Screen Most Recent Result Date    No resulted procedures found. REVIEW OF SYSTEMS : 1/24/2022  ALL BELOW ARE Negative except : SEE HPI     All other systems reviewed and are negative. 12 point review of systems otherwise negative unless noted in HPI. RADIOGRAPHS// IMAGING//DIAGNOSTIC DATA     Orders Placed This Encounter    meloxicam (MOBIC) 15 mg tablet        I have personally reviewed the images of the above study. The interpretation of this study is my professional opinion            I have spent 30 minutes reviewing the previous notes, reviewing diagnostic studies [Advanced  Imaging, Diagnostic test results (x-rays)] and had a direct face to face with the patient discussing the diagnosis and importance of compliance with the treatment and plan. There is  discussion for the potential for surgery, answering all questions, as well as documenting patient care coordination for this individual on the day of the visit. Disclaimer:     Sections of this note are dictated using utilizing voice recognition software, which may have resulted in some phonetic based errors in grammar and contents. Even though attempts were made to correct all the mistakes, some may have been missed, and remained in the body of the document. If questions arise, please contact our department. An electronic signature was used to authenticate this note. Corine Fried may have a reminder for a \"due or due soon\" health maintenance. I have asked that she contact her primary care provider for follow-up on this health maintenance. There are no Patient Instructions on file for this visit. Please follow up with your PCP for any health maintenance as recommended.               Juliane Dunham, as dictated by, Chris Arana  1/24/2022  4:37 PM

## 2022-01-24 ENCOUNTER — OFFICE VISIT (OUTPATIENT)
Dept: ORTHOPEDIC SURGERY | Age: 37
End: 2022-01-24
Payer: MEDICARE

## 2022-01-24 VITALS — WEIGHT: 289 LBS | HEIGHT: 68 IN | BODY MASS INDEX: 43.8 KG/M2 | TEMPERATURE: 97.7 F

## 2022-01-24 DIAGNOSIS — M19.172 POST-TRAUMATIC OSTEOARTHRITIS OF LEFT ANKLE: Primary | ICD-10-CM

## 2022-01-24 PROCEDURE — G8432 DEP SCR NOT DOC, RNG: HCPCS | Performed by: ORTHOPAEDIC SURGERY

## 2022-01-24 PROCEDURE — G8417 CALC BMI ABV UP PARAM F/U: HCPCS | Performed by: ORTHOPAEDIC SURGERY

## 2022-01-24 PROCEDURE — G8427 DOCREV CUR MEDS BY ELIG CLIN: HCPCS | Performed by: ORTHOPAEDIC SURGERY

## 2022-01-24 PROCEDURE — 99214 OFFICE O/P EST MOD 30 MIN: CPT | Performed by: ORTHOPAEDIC SURGERY

## 2022-01-24 RX ORDER — MELOXICAM 15 MG/1
15 TABLET ORAL DAILY
Qty: 90 TABLET | Refills: 0 | Status: SHIPPED | OUTPATIENT
Start: 2022-01-24

## 2022-03-21 ENCOUNTER — TELEPHONE (OUTPATIENT)
Dept: ORTHOPEDIC SURGERY | Age: 37
End: 2022-03-21

## 2022-04-18 NOTE — PROGRESS NOTES
AMBULATORY PROGRESS NOTE      Patient: Ronald Esteban             MRN: [id number]     SSN: [id number] There is no height or weight on file to calculate BMI. YOB: 1985     AGE: 44 y.o. EX: female    PCP: Orestes Rosenthal MD       IMPRESSION //  DIAGNOSIS AND TREATMENT PLAN        Ronald Esteban has a diagnosis of:      She still feels that there is hardware that, goes across her ankle, that is in the wrong \"wrong position\". I reviewed x-rays from November 2021, and I reviewed the x-rays today with her. They are exactly the same, showing a healed tibia fracture, no change in hardware position, there are 2 distal screws that are well embedded in bone with no signs of loosening. She has some mild OA across her ankle joint, left ankle, I cannot find any regions of point tenderness,, at this current time, on this evaluation, this time. As needed RTO    DIAGNOSES    1. Chronic pain of left ankle    2. Arthritis of left ankle    3. Sprain of left ankle, unspecified ligament, initial encounter        Orders Placed This Encounter    AMB POC XRAY, ANKLE; COMPLETE, 3+ VIE     ASK ALL FEMALE PATIENTS IF THEY ARE PREGNANT     Order Specific Question:   Reason for Exam     Answer:   PAIN     Order Specific Question:   Is Patient Pregnant? Answer:   Unknown    meloxicam (MOBIC) 15 mg tablet     Sig: Take 1 Tablet by mouth daily. Dispense:  30 Tablet     Refill:  1    famotidine (PEPCID) 40 mg tablet     Sig: Take 1 Tablet by mouth daily. Dispense:  30 Tablet     Refill:  1            PLAN:    1. Obtain 3-View XR of her left ankle  2. Rx of Mobic/Pepcid    RTO : PRN    There are no Patient Instructions on file for this visit. Follow-up and Dispositions  ·   Return if symptoms worsen or fail to improve.          Please follow up with your PCP for any health maintenance as recommended         Ronald Esteban  expresses understanding of the diagnosis, treatment plan, and all of their proposed questions were answered to their satisfaction. Patient education has been provided re the diagnoses. HPI //  Kathryn Garcia4 IS A 44 y.o. female who is a/an  established patient, presenting to my outpatient office for evaluation of  the following chief complaint(s):     Chief Complaint   Patient presents with    Ankle Pain     left       At [facility] presented w/ post-traumatic left ankle OA. 2-month follow-up. Activities as tolerated. Since LOV Tyrell Cervantes continues to endorse left ankle pain. She notes tenderness along the anteromedial and medial portion of her left ankle. Visit Vitals  Temp 98.2 °F (36.8 °C)       Appearance: Alert, well appearing and pleasant patient who is in no distress, oriented to person, place/time, and who follows commands. Normal dress/motor activity/thought processes/memory. This patient is accompanied in the examination room by her  self. Patient arrives to office via: without assistive device:     Psychiatric:  Normal Affect/mood. Judgement, behavior, and conduct are appropriate. Speech normal in context and clarity, memory intact grossly, no involuntary movements - tremors. H EENT (2): Head normocephalic & atraumatic. Eye: pupils are round// EOM are intact // Neck: ROM WNL  // Hearings Intact   Respiratory: Breathing non labored     ANKLE/FOOT left    Gait: normal  Tenderness: mild /SLIGHT over  anteromedial ankl  Cutaneous:  WNL. Joint Motion:  WNL   Joint / Tendon Stability:  No Ankle or Subtalar instability or joint laxity. No peroneal sublux ability or dislocation  Alignment: neutral Hindfoot,    Neuro Motor/Sensory: NL/NL  Vascular: NL foot/ankle pulses,    Lymphatics: No extremity lymphedema, No calf swelling, no tenderness to calf muscles.           CHART REVIEW     Tyrell Cervantes has been experiencing pain and discomfort confirmed as outlined in the pain assessment outlined below.  was reviewed by Johana Guillen MD on 4/25/2022. Pain Assessment  4/25/2022   Location of Pain Ankle   Location Modifiers Left   Severity of Pain 5   Quality of Pain Dull   Frequency of Pain Intermittent   Aggravating Factors Walking;Standing   Limiting Behavior No   Relieving Factors -   Result of Injury -        Nesha Turner  has no past medical history on file. Patients is employed at:          has no past medical history on file. has a past surgical history that includes hx ankle fracture tx.   family history includes No Known Problems in her father and mother. Current Outpatient Medications   Medication Sig    meloxicam (MOBIC) 15 mg tablet Take 1 Tablet by mouth daily.  famotidine (PEPCID) 40 mg tablet Take 1 Tablet by mouth daily.  meloxicam (MOBIC) 15 mg tablet Take 1 Tablet by mouth daily.  folic acid 0.8 mg cap Take 1 Tab by mouth daily.  levonorgestreL (MIRENA) 20 mcg/24 hours (6 yrs) 52 mg IUD by IntraUTERine route.  famotidine (PEPCID) 40 mg tablet TAKE 1 TABLET BY MOUTH DAILY (Patient not taking: Reported on 1/24/2022)    metroNIDAZOLE (FLAGYL) 500 mg tablet TAKE 1 TABLET BY MOUTH EVERY 12 HOURS FOR 6 DAYS (Patient not taking: Reported on 1/24/2022)    cloNIDine HCL (CATAPRES) 0.1 mg tablet  (Patient not taking: Reported on 1/24/2022)    risperiDONE (RisperDAL) 1 mg tablet  (Patient not taking: Reported on 1/24/2022)    diclofenac (VOLTAREN) 1 % gel Apply  to affected area four (4) times daily. (Patient not taking: Reported on 1/24/2022)     No current facility-administered medications for this visit. No Known Allergies  Social History     Occupational History    Not on file   Tobacco Use    Smoking status: Current Some Day Smoker    Smokeless tobacco: Never Used   Substance and Sexual Activity    Alcohol use: Yes    Drug use: Not on file    Sexual activity: Not on file       reports that she has been smoking.  She has never used smokeless tobacco. She reports current alcohol use. DIAGNOSTIC LAB DATA      No results found for: HBA1C, HVP4HZDZ, KOP4QFSA // No results found for: GLU, GLUCPOC     No results found for: YMZ4BDJX, ILF7JZAY      No results found for: VITD3, XQVID2, XQVID3, XQVID, VD3RIA, QQUI65FUFOD     Drug Screen Most Recent Result Date    No resulted procedures found. REVIEW OF SYSTEMS : 4/25/2022  ALL BELOW ARE Negative except : SEE HPI     All other systems reviewed and are negative. 12 point review of systems otherwise negative unless noted in HPI. RADIOGRAPHS// IMAGING//DIAGNOSTIC DATA     Orders Placed This Encounter    AMB POC XRAY, ANKLE; COMPLETE, 3+ VIE    meloxicam (MOBIC) 15 mg tablet    famotidine (PEPCID) 40 mg tablet        These were x-rays of her left tibia and fibula. AP and  LATERAL IMAGES  4/25/2022   The impression was healed distal tibia and fibula diaphyseal fracture deformities with ORIF hardware in place.  No acute radiographic abnormality. Ryan Motto is some mild degenerative subchondral cystic formation at the distal roof of the tibial plafond.  Mild medial tibiofemoral compartment degenerative marginal osteophytic formation with preserved joint spaces however. I have personally reviewed the images of the above study. The interpretation of this study is my professional opinion             I have spent 30 minutes reviewing the previous notes, reviewing diagnostic studies [Advanced  Imaging, Diagnostic test results (x-rays)] and had a direct face to face with the patient discussing the diagnosis and importance of compliance with the treatment and plan. There is  discussion for the potential for surgery, answering all questions, as well as documenting patient care coordination for this individual on the day of the visit.           Disclaimer:     Sections of this note are dictated using utilizing voice recognition software, which may have resulted in some phonetic based errors in grammar and contents. Even though attempts were made to correct all the mistakes, some may have been missed, and remained in the body of the document. If questions arise, please contact our department. An electronic signature was used to authenticate this note. Garcia Hale may have a reminder for a \"due or due soon\" health maintenance. I have asked that she contact her primary care provider for follow-up on this health maintenance. There are no Patient Instructions on file for this visit. Follow-up and Dispositions  ·   Return if symptoms worsen or fail to improve. Please follow up with your PCP for any health maintenance as recommended. Enma Nunez,as dictated by, Catalina Diaz.   4/25/2022  8:17 AM

## 2022-04-25 ENCOUNTER — OFFICE VISIT (OUTPATIENT)
Dept: ORTHOPEDIC SURGERY | Age: 37
End: 2022-04-25
Payer: MEDICARE

## 2022-04-25 VITALS — TEMPERATURE: 98.2 F

## 2022-04-25 DIAGNOSIS — S93.402A SPRAIN OF LEFT ANKLE, UNSPECIFIED LIGAMENT, INITIAL ENCOUNTER: ICD-10-CM

## 2022-04-25 DIAGNOSIS — G89.29 CHRONIC PAIN OF LEFT ANKLE: Primary | ICD-10-CM

## 2022-04-25 DIAGNOSIS — M25.572 CHRONIC PAIN OF LEFT ANKLE: Primary | ICD-10-CM

## 2022-04-25 DIAGNOSIS — M19.072 ARTHRITIS OF LEFT ANKLE: ICD-10-CM

## 2022-04-25 PROCEDURE — G8432 DEP SCR NOT DOC, RNG: HCPCS | Performed by: ORTHOPAEDIC SURGERY

## 2022-04-25 PROCEDURE — G8417 CALC BMI ABV UP PARAM F/U: HCPCS | Performed by: ORTHOPAEDIC SURGERY

## 2022-04-25 PROCEDURE — 99213 OFFICE O/P EST LOW 20 MIN: CPT | Performed by: ORTHOPAEDIC SURGERY

## 2022-04-25 PROCEDURE — G8427 DOCREV CUR MEDS BY ELIG CLIN: HCPCS | Performed by: ORTHOPAEDIC SURGERY

## 2022-04-25 PROCEDURE — 73610 X-RAY EXAM OF ANKLE: CPT | Performed by: ORTHOPAEDIC SURGERY

## 2022-04-25 RX ORDER — FAMOTIDINE 40 MG/1
40 TABLET, FILM COATED ORAL DAILY
Qty: 30 TABLET | Refills: 0 | Status: SHIPPED | OUTPATIENT
Start: 2022-04-25 | End: 2022-04-26

## 2022-04-25 RX ORDER — FAMOTIDINE 40 MG/1
40 TABLET, FILM COATED ORAL DAILY
Qty: 30 TABLET | Refills: 1 | Status: SHIPPED | OUTPATIENT
Start: 2022-04-25 | End: 2022-04-25 | Stop reason: SDUPTHER

## 2022-04-25 RX ORDER — MELOXICAM 15 MG/1
15 TABLET ORAL DAILY
Qty: 30 TABLET | Refills: 1 | Status: SHIPPED | OUTPATIENT
Start: 2022-04-25

## 2022-04-26 DIAGNOSIS — S93.402A SPRAIN OF LEFT ANKLE, UNSPECIFIED LIGAMENT, INITIAL ENCOUNTER: ICD-10-CM

## 2022-04-26 RX ORDER — FAMOTIDINE 40 MG/1
40 TABLET, FILM COATED ORAL DAILY
Qty: 90 TABLET | Refills: 0 | Status: SHIPPED | OUTPATIENT
Start: 2022-04-26 | End: 2022-05-26

## 2022-07-08 ENCOUNTER — OFFICE VISIT (OUTPATIENT)
Dept: ORTHOPEDIC SURGERY | Age: 37
End: 2022-07-08
Payer: MEDICARE

## 2022-07-08 VITALS — BODY MASS INDEX: 43.94 KG/M2 | TEMPERATURE: 97.9 F | WEIGHT: 289 LBS

## 2022-07-08 DIAGNOSIS — M25.531 RIGHT WRIST PAIN: ICD-10-CM

## 2022-07-08 DIAGNOSIS — M65.9 FCR (FLEXOR CARPI RADIALIS) TENOSYNOVITIS: ICD-10-CM

## 2022-07-08 DIAGNOSIS — M65.4 DE QUERVAIN'S TENOSYNOVITIS, RIGHT: Primary | ICD-10-CM

## 2022-07-08 PROCEDURE — 73110 X-RAY EXAM OF WRIST: CPT | Performed by: ORTHOPAEDIC SURGERY

## 2022-07-08 PROCEDURE — 20550 NJX 1 TENDON SHEATH/LIGAMENT: CPT | Performed by: ORTHOPAEDIC SURGERY

## 2022-07-08 PROCEDURE — G8427 DOCREV CUR MEDS BY ELIG CLIN: HCPCS | Performed by: ORTHOPAEDIC SURGERY

## 2022-07-08 PROCEDURE — 99214 OFFICE O/P EST MOD 30 MIN: CPT | Performed by: ORTHOPAEDIC SURGERY

## 2022-07-08 PROCEDURE — G8432 DEP SCR NOT DOC, RNG: HCPCS | Performed by: ORTHOPAEDIC SURGERY

## 2022-07-08 PROCEDURE — G8417 CALC BMI ABV UP PARAM F/U: HCPCS | Performed by: ORTHOPAEDIC SURGERY

## 2022-07-08 NOTE — PROGRESS NOTES
Nicholas White is a 44 y.o. female right handed unspecified employment. Worker's Compensation and legal considerations: none filed. Vitals:    07/08/22 1259   Temp: 97.9 °F (36.6 °C)   Weight: 289 lb (131.1 kg)   PainSc:  10 - Worst pain ever   PainLoc: Wrist           Chief Complaint   Patient presents with    Wrist Pain     right         HPI: Patient presents today with complaints of right wrist pain especially in the thumb side and somewhat on the volar aspect of the wrist.    Date of onset: Indeterminate    Injury: No    Prior Treatment:  No    Numbness/ Tingling: No      ROS: Review of Systems - General ROS: negative  Psychological ROS: negative  ENT ROS: negative  Allergy and Immunology ROS: negative  Hematological and Lymphatic ROS: negative  Respiratory ROS: no cough, shortness of breath, or wheezing  Cardiovascular ROS: no chest pain or dyspnea on exertion  Gastrointestinal ROS: no abdominal pain, change in bowel habits, or black or bloody stools  Musculoskeletal ROS: negative  Neurological ROS: negative  Dermatological ROS: negative    History reviewed. No pertinent past medical history. Past Surgical History:   Procedure Laterality Date    HX ANKLE FRACTURE TX         Current Outpatient Medications   Medication Sig Dispense Refill    meloxicam (MOBIC) 15 mg tablet Take 1 Tablet by mouth daily. 30 Tablet 1    meloxicam (MOBIC) 15 mg tablet Take 1 Tablet by mouth daily. 90 Tablet 0    folic acid 0.8 mg cap Take 1 Tab by mouth daily.  levonorgestreL (MIRENA) 20 mcg/24 hours (6 yrs) 52 mg IUD by IntraUTERine route.       famotidine (PEPCID) 40 mg tablet TAKE 1 TABLET BY MOUTH DAILY (Patient not taking: Reported on 1/24/2022) 90 Tablet 2    metroNIDAZOLE (FLAGYL) 500 mg tablet TAKE 1 TABLET BY MOUTH EVERY 12 HOURS FOR 6 DAYS (Patient not taking: Reported on 1/24/2022)      cloNIDine HCL (CATAPRES) 0.1 mg tablet  (Patient not taking: Reported on 1/24/2022)      risperiDONE (RisperDAL) 1 mg tablet  (Patient not taking: Reported on 2022)      diclofenac (VOLTAREN) 1 % gel Apply  to affected area four (4) times daily. (Patient not taking: Reported on 2022) 100 g 5     Current Facility-Administered Medications   Medication Dose Route Frequency Provider Last Rate Last Admin    triamcinolone acetonide (KENALOG) 10 mg/mL injection 5 mg  5 mg Other ONCE Brendan Dinh, DO           No Known Allergies        PE:     Physical Exam  Vitals and nursing note reviewed. Constitutional:       General: She is not in acute distress. Appearance: Normal appearance. She is not ill-appearing. Cardiovascular:      Pulses: Normal pulses. Pulmonary:      Effort: Pulmonary effort is normal. No respiratory distress. Musculoskeletal:         General: Swelling and tenderness present. No deformity or signs of injury. Normal range of motion. Cervical back: Normal range of motion and neck supple. Right lower leg: No edema. Left lower leg: No edema. Skin:     General: Skin is warm and dry. Capillary Refill: Capillary refill takes less than 2 seconds. Findings: No bruising or erythema. Neurological:      General: No focal deficit present. Mental Status: She is alert and oriented to person, place, and time. Psychiatric:         Mood and Affect: Mood normal.         Behavior: Behavior normal.            Wrist:    Tenderness L R Test L R   1st Ext Comp - + Finkelstein's - +   Snuff Box - - Herr - -   2nd Ext Comp - - S-L Shear - -   S-L Joint - - L-T Shear - -   L-T Joint - - DRUJ Sup - -   6th Ext Comp - - DRUJ Pro - -   Ulnar Snuff - - DRUJ Grind - -   Fovea - - TFCC - -   STT Joint - - Mid-Carp Inst - -   FCR - + P-T Grind - -   Intersection - - ECU Sublux. - -      Dorsal Ganglion: -   Volar Ganglion: -      ROM: Full        Imagin,022 3 views of right wrist does not show any fracture or dislocation or any other osseous abnormalities.         ICD-10-CM ICD-9-CM 1. De Quervain's tenosynovitis, right  M65.4 727.04 triamcinolone acetonide (KENALOG) 10 mg/mL injection 5 mg      INJECT TENDON SHEATH/LIGAMENT      AMB SUPPLY ORDER   2. FCR (flexor carpi radialis) tenosynovitis  M65.9 727.05    3. Right wrist pain  M25.531 719.43 AMB POC XRAY, WRIST; COMPLETE, 3+ VIE         Plan:     Right wrist DQ injection and brace. Follow-up and Dispositions    · Return in about 6 weeks (around 8/19/2022) for Reevaluation and exercises. Plan was reviewed with patient, who verbalized agreement and understanding of the plan    Susan 36 NOTE        Chart reviewed for the following:   Brendan MCCAULEY DO, have reviewed the History, Physical and updated the Allergic reactions for [address] performed immediately prior to start of procedure:   Brendan MCCAULEY DO, have performed the following reviews on [facility] prior to the start of the procedure:            * Patient was identified by name and date of birth   * Agreement on procedure being performed was verified  * Risks and Benefits explained to the patient  * Procedure site verified and marked as necessary  * Patient was positioned for comfort  * Consent was signed and verified     Time: 13:19    Date of procedure: 7/8/2022    Procedure performed by: Patrick Thomson DO    Provider assisted by: Letty Ortiz LPN    Patient assisted by: self    How tolerated by patient: tolerated the procedure well with no complications    Post Procedural Pain Scale: 0 - No Hurt    Comments: none    Procedure:  After consent was obtained, using sterile technique the right wrist was prepped. Local anesthetic used: 1% lidocaine. Kenalog 5 mg and was then injected and the needle withdrawn. The procedure was well tolerated. The patient is asked to continue to rest the area for a few more days before resuming regular activities.   It may be more painful for the first 1-2 days. Watch for fever, or increased swelling or persistent pain in the joint. Call or return to clinic prn if such symptoms occur or there is failure to improve as anticipated.

## 2022-10-14 ENCOUNTER — OFFICE VISIT (OUTPATIENT)
Dept: ORTHOPEDIC SURGERY | Age: 37
End: 2022-10-14
Payer: MEDICARE

## 2022-10-14 VITALS
SYSTOLIC BLOOD PRESSURE: 127 MMHG | HEART RATE: 91 BPM | TEMPERATURE: 97 F | DIASTOLIC BLOOD PRESSURE: 84 MMHG | WEIGHT: 289 LBS | HEIGHT: 68 IN | BODY MASS INDEX: 43.8 KG/M2 | RESPIRATION RATE: 14 BRPM

## 2022-10-14 DIAGNOSIS — M65.4 DE QUERVAIN'S TENOSYNOVITIS, RIGHT: Primary | ICD-10-CM

## 2022-10-14 PROCEDURE — 20550 NJX 1 TENDON SHEATH/LIGAMENT: CPT | Performed by: ORTHOPAEDIC SURGERY

## 2022-10-14 NOTE — PROGRESS NOTES
Bella Self is a 44 y.o. female right handed unspecified employment. Worker's Compensation and legal considerations: none filed. Vitals:    10/14/22 1257   BP: 127/84   Pulse: 91   Resp: 14   Temp: 97 °F (36.1 °C)   Weight: 289 lb (131.1 kg)   Height: 5' 8\" (1.727 m)   PainSc:   9   PainLoc: Hand           Chief Complaint   Patient presents with    Hand Pain     Right         HPI: Patient presents today due to recurrence of right wrist pain. She reports the injection previously helped until recently. Initial HPI: Patient presents today with complaints of right wrist pain especially in the thumb side and somewhat on the volar aspect of the wrist.    Date of onset: Indeterminate    Injury: No    Prior Treatment:  Yes: Comment: Right first dorsal compartment injection    Numbness/ Tingling: No      ROS: Review of Systems - General ROS: negative  Psychological ROS: negative  ENT ROS: negative  Allergy and Immunology ROS: negative  Hematological and Lymphatic ROS: negative  Respiratory ROS: no cough, shortness of breath, or wheezing  Cardiovascular ROS: no chest pain or dyspnea on exertion  Gastrointestinal ROS: no abdominal pain, change in bowel habits, or black or bloody stools  Musculoskeletal ROS: negative  Neurological ROS: negative  Dermatological ROS: negative    History reviewed. No pertinent past medical history. Past Surgical History:   Procedure Laterality Date    HX ANKLE FRACTURE TX         Current Outpatient Medications   Medication Sig Dispense Refill    meloxicam (MOBIC) 15 mg tablet Take 1 Tablet by mouth daily. 30 Tablet 1    meloxicam (MOBIC) 15 mg tablet Take 1 Tablet by mouth daily. 90 Tablet 0    folic acid 0.8 mg cap Take 1 Tab by mouth daily. levonorgestreL (MIRENA) 20 mcg/24 hours (6 yrs) 52 mg IUD by IntraUTERine route.       famotidine (PEPCID) 40 mg tablet TAKE 1 TABLET BY MOUTH DAILY (Patient not taking: No sig reported) 90 Tablet 2    metroNIDAZOLE (FLAGYL) 500 mg tablet TAKE 1 TABLET BY MOUTH EVERY 12 HOURS FOR 6 DAYS (Patient not taking: No sig reported)      cloNIDine HCL (CATAPRES) 0.1 mg tablet  (Patient not taking: No sig reported)      risperiDONE (RisperDAL) 1 mg tablet  (Patient not taking: No sig reported)      diclofenac (VOLTAREN) 1 % gel Apply  to affected area four (4) times daily. (Patient not taking: No sig reported) 100 g 5     Current Facility-Administered Medications   Medication Dose Route Frequency Provider Last Rate Last Admin    triamcinolone acetonide (KENALOG) 10 mg/mL injection 5 mg  5 mg Other ONCE Brendan Dinh, DO           No Known Allergies        PE:     Physical Exam  Vitals and nursing note reviewed. Constitutional:       General: She is not in acute distress. Appearance: Normal appearance. She is not ill-appearing. Cardiovascular:      Pulses: Normal pulses. Pulmonary:      Effort: Pulmonary effort is normal. No respiratory distress. Musculoskeletal:         General: Swelling and tenderness present. No deformity or signs of injury. Normal range of motion. Cervical back: Normal range of motion and neck supple. Right lower leg: No edema. Left lower leg: No edema. Skin:     General: Skin is warm and dry. Capillary Refill: Capillary refill takes less than 2 seconds. Findings: No bruising or erythema. Neurological:      General: No focal deficit present. Mental Status: She is alert and oriented to person, place, and time. Psychiatric:         Mood and Affect: Mood normal.         Behavior: Behavior normal.          Wrist:    Tenderness L R Test L R   1st Ext Comp - + Finkelstein's - +   Snuff Box - - Herr - -   2nd Ext Comp - - S-L Shear - -   S-L Joint - - L-T Shear - -   L-T Joint - - DRUJ Sup - -   6th Ext Comp - - DRUJ Pro - -   Ulnar Snuff - - DRUJ Grind - -   Fovea - - TFCC - -   STT Joint - - Mid-Carp Inst - -   FCR - + P-T Grind - -   Intersection - - ECU Sublux.  - -      Dorsal Ganglion: -   Volar Ganglion: -      ROM: Full        Imagin/2022 3 views of right wrist does not show any fracture or dislocation or any other osseous abnormalities. ICD-10-CM ICD-9-CM    1. De Quervain's tenosynovitis, right  M65.4 727.04 triamcinolone acetonide (KENALOG) 10 mg/mL injection 5 mg      INJECT TENDON SHEATH/LIGAMENT            Plan:     Repeat Right wrist DQ injection and brace. Advised to continue wearing brace for the next 6 weeks consistently. We also discussed possibility of surgery in the future if this keeps recurring. Follow-up and Dispositions    Return if symptoms worsen or fail to improve. Plan was reviewed with patient, who verbalized agreement and understanding of the plan    Susan 36 NOTE        Chart reviewed for the following:   Brendan MCCAULEY DO, have reviewed the History, Physical and updated the Allergic reactions for [address] performed immediately prior to start of procedure:   Brendan MCCAULEY DO, have performed the following reviews on [facility] prior to the start of the procedure:            * Patient was identified by name and date of birth   * Agreement on procedure being performed was verified  * Risks and Benefits explained to the patient  * Procedure site verified and marked as necessary  * Patient was positioned for comfort  * Consent was signed and verified     Time: 13:14    Date of procedure: 10/14/2022    Procedure performed by: Chantelle Tao DO    Provider assisted by: Shai Goins LPN    Patient assisted by: self    How tolerated by patient: tolerated the procedure well with no complications    Post Procedural Pain Scale: 0 - No Hurt    Comments: none    Procedure:  After consent was obtained, using sterile technique the right wrist was prepped. Local anesthetic used: 1% lidocaine. Kenalog 5 mg and was then injected and the needle withdrawn.   The procedure was well tolerated. The patient is asked to continue to rest the area for a few more days before resuming regular activities. It may be more painful for the first 1-2 days. Watch for fever, or increased swelling or persistent pain in the joint. Call or return to clinic prn if such symptoms occur or there is failure to improve as anticipated.

## 2022-12-23 ENCOUNTER — OFFICE VISIT (OUTPATIENT)
Dept: ORTHOPEDIC SURGERY | Age: 37
End: 2022-12-23
Payer: MEDICARE

## 2022-12-23 VITALS
HEART RATE: 82 BPM | WEIGHT: 287 LBS | HEIGHT: 68 IN | BODY MASS INDEX: 43.5 KG/M2 | TEMPERATURE: 97.5 F | SYSTOLIC BLOOD PRESSURE: 139 MMHG | DIASTOLIC BLOOD PRESSURE: 87 MMHG | OXYGEN SATURATION: 98 %

## 2022-12-23 DIAGNOSIS — M65.4 DE QUERVAIN'S TENOSYNOVITIS, RIGHT: Primary | ICD-10-CM

## 2022-12-23 RX ORDER — PALIPERIDONE PALMITATE 234 MG/1.5ML
INJECTION INTRAMUSCULAR
COMMUNITY
Start: 2022-12-21

## 2022-12-23 NOTE — PROGRESS NOTES
Keith Lackey is a 44 y.o. female right handed unspecified employment. Worker's Compensation and legal considerations: none filed. Vitals:    12/23/22 1031   BP: 139/87   Pulse: 82   Temp: 97.5 °F (36.4 °C)   TempSrc: Temporal   SpO2: 98%   Weight: 287 lb (130.2 kg)   Height: 5' 8\" (1.727 m)   PainSc:  10 - Worst pain ever   PainLoc: Wrist           Chief Complaint   Patient presents with    Wrist Pain     Right        HPI: Patient presents today with complaints of recurrence of right de Quervain's tenosynovitis with minimal help at last injection. 10/14/2022 HPI: Patient presents today due to recurrence of right wrist pain. She reports the injection previously helped until recently. Initial HPI: Patient presents today with complaints of right wrist pain especially in the thumb side and somewhat on the volar aspect of the wrist.    Date of onset: Indeterminate    Injury: No    Prior Treatment:  Yes: Comment: Right first dorsal compartment injection    Numbness/ Tingling: No      ROS: Review of Systems - General ROS: negative  Psychological ROS: negative  ENT ROS: negative  Allergy and Immunology ROS: negative  Hematological and Lymphatic ROS: negative  Respiratory ROS: no cough, shortness of breath, or wheezing  Cardiovascular ROS: no chest pain or dyspnea on exertion  Gastrointestinal ROS: no abdominal pain, change in bowel habits, or black or bloody stools  Musculoskeletal ROS: negative  Neurological ROS: negative  Dermatological ROS: negative    History reviewed. No pertinent past medical history. Past Surgical History:   Procedure Laterality Date    HX ANKLE FRACTURE TX         Current Outpatient Medications   Medication Sig Dispense Refill    Invega Sustenna 234 mg/1.5 mL injection       meloxicam (MOBIC) 15 mg tablet Take 1 Tablet by mouth daily. 30 Tablet 1    meloxicam (MOBIC) 15 mg tablet Take 1 Tablet by mouth daily.  90 Tablet 0    metroNIDAZOLE (FLAGYL) 500 mg tablet TAKE 1 TABLET BY MOUTH EVERY 12 HOURS FOR 6 DAYS      folic acid 0.8 mg cap Take 1 Tab by mouth daily. levonorgestreL (MIRENA) 20 mcg/24 hours (6 yrs) 52 mg IUD by IntraUTERine route. risperiDONE (RisperDAL) 1 mg tablet       famotidine (PEPCID) 40 mg tablet TAKE 1 TABLET BY MOUTH DAILY (Patient not taking: No sig reported) 90 Tablet 2    cloNIDine HCL (CATAPRES) 0.1 mg tablet  (Patient not taking: No sig reported)      diclofenac (VOLTAREN) 1 % gel Apply  to affected area four (4) times daily. (Patient not taking: No sig reported) 100 g 5       No Known Allergies        PE:     Physical Exam  Vitals and nursing note reviewed. Constitutional:       General: She is not in acute distress. Appearance: Normal appearance. She is not ill-appearing. Cardiovascular:      Pulses: Normal pulses. Pulmonary:      Effort: Pulmonary effort is normal. No respiratory distress. Musculoskeletal:         General: Swelling and tenderness present. No deformity or signs of injury. Normal range of motion. Cervical back: Normal range of motion and neck supple. Right lower leg: No edema. Left lower leg: No edema. Skin:     General: Skin is warm and dry. Capillary Refill: Capillary refill takes less than 2 seconds. Findings: No bruising or erythema. Neurological:      General: No focal deficit present. Mental Status: She is alert and oriented to person, place, and time. Psychiatric:         Mood and Affect: Mood normal.         Behavior: Behavior normal.          Wrist:    Tenderness L R Test L R   1st Ext Comp - + Finkelstein's - +   Snuff Box - - Herr - -   2nd Ext Comp - - S-L Shear - -   S-L Joint - - L-T Shear - -   L-T Joint - - DRUJ Sup - -   6th Ext Comp - - DRUJ Pro - -   Ulnar Snuff - - DRUJ Grind - -   Fovea - - TFCC - -   STT Joint - - Mid-Carp Inst - -   FCR - - P-T Grind - -   Intersection - - ECU Sublux.  - -      Dorsal Ganglion: -   Volar Ganglion: -      ROM: Full        Imaging: 7/2022 3 views of right wrist does not show any fracture or dislocation or any other osseous abnormalities. ICD-10-CM ICD-9-CM    1. De Quervain's tenosynovitis, right  M65.4 727.04 SCHEDULE SURGERY            Plan:     Schedule right wrist first dorsal compartment release    Follow-up and Dispositions    Return for H&P.           Plan was reviewed with patient, who verbalized agreement and understanding of the plan

## 2022-12-23 NOTE — LETTER
12/23/2022    Patient: Juaquin Brooks   YOB: 1985   Date of Visit: 12/23/2022     Desiree Patel MD  [address] [facility] [address]  Via Fax: [phone]    Dear Desiree Patel MD,      Thank you for referring Ms. Juaquin Brooks to [facility] for evaluation. My notes for this consultation are attached. If you have questions, please do not hesitate to call me. I look forward to following your patient along with you.       Sincerely,    Harjit Dumont, DO

## 2023-01-25 NOTE — PERIOP NOTES
PRE-SURGICAL INSTRUCTIONS        Patient's Name:  Bella Self      TPZWB'V Date:  1/25/2023            Covid Testing Date and Time:    Surgery Date:  1/31/2023                Do NOT eat or drink anything, including candy, gum, or ice chips after midnight on 01/30, unless you have specific instructions from your surgeon or anesthesia provider to do so. You may brush your teeth before coming to the hospital.  No smoking 24 hours prior to the day of surgery. No alcohol 24 hours prior to the day of surgery. No recreational drugs for one week prior to the day of surgery. Leave all valuables, including money/purse, at home. Remove all jewelry, nail polish, acrylic nails, and makeup (including mascara); no lotions powders, deodorant, or perfume/cologne/after shave on the skin. Follow instruction for Hibiclens washes and CHG wipes from surgeon's office. Glasses/contact lenses and dentures may be worn to the hospital.  They will be removed prior to surgery. Call your doctor if symptoms of a cold or illness develop within 24-48 hours prior to your surgery. 11.  If you are having an outpatient procedure, please make arrangements for a responsible ADULT TO [address] [place] [address] and stay with you for 24 hours after your surgery. 12. ONE VISITOR in the hospital at this time for outpatient procedures. Exceptions may be made for surgical admissions, per nursing unit guidelines      Special Instructions:    Bring COVID card/info. Bring list of CURRENT medications. Bring any pertinent legal medical records. Take these medications the morning of surgery with a sip of water:  None  Follow physician instructions about stopping anticoagulants. On the day of surgery, come in the main entrance of [facility]. Let the  at the desk know you are there for surgery. A staff member will come escort you to the surgical area on the second floor.     If you have any questions or concerns, please do not hesitate to call:     (Prior to the day of surgery) PAT department:  [phone]   (Day of surgery) Pre-Op department:  [phone]    These surgical instructions were reviewed with Katiana Yoon during the [facility] phone call.

## 2023-01-27 ENCOUNTER — OFFICE VISIT (OUTPATIENT)
Dept: ORTHOPEDIC SURGERY | Age: 38
End: 2023-01-27

## 2023-01-27 VITALS
WEIGHT: 286.8 LBS | BODY MASS INDEX: 43.61 KG/M2 | SYSTOLIC BLOOD PRESSURE: 135 MMHG | DIASTOLIC BLOOD PRESSURE: 81 MMHG | HEART RATE: 83 BPM | TEMPERATURE: 97.4 F

## 2023-01-27 DIAGNOSIS — M65.4 DE QUERVAIN'S TENOSYNOVITIS, RIGHT: Primary | ICD-10-CM

## 2023-01-27 NOTE — PROGRESS NOTES
Michelle Bran is a 43 y.o. female right handed unspecified employment. Worker's Compensation and legal considerations: none filed. Vitals:    01/27/23 1115   BP: 135/81   Pulse: 83   Temp: 97.4 °F (36.3 °C)   Weight: 286 lb 12.8 oz (130.1 kg)   PainSc:   7           Chief Complaint   Patient presents with    Surg H&P       HPI: Patient presents today for preoperative visit scheduled for right first dorsal compartment release next week. She denies any changes. 12/23/2022 HPI: Patient presents today with complaints of recurrence of right de Quervain's tenosynovitis with minimal help at last injection. 10/14/2022 HPI: Patient presents today due to recurrence of right wrist pain. She reports the injection previously helped until recently. Initial HPI: Patient presents today with complaints of right wrist pain especially in the thumb side and somewhat on the volar aspect of the wrist.    Date of onset: Indeterminate    Injury: No    Prior Treatment:  Yes: Comment: Right first dorsal compartment injection    Numbness/ Tingling: No      ROS: Review of Systems - General ROS: negative  Psychological ROS: negative  ENT ROS: negative  Allergy and Immunology ROS: negative  Hematological and Lymphatic ROS: negative  Respiratory ROS: no cough, shortness of breath, or wheezing  Cardiovascular ROS: no chest pain or dyspnea on exertion  Gastrointestinal ROS: no abdominal pain, change in bowel habits, or black or bloody stools  Musculoskeletal ROS: negative  Neurological ROS: negative  Dermatological ROS: negative    Past Medical History:   Diagnosis Date    Bipolar 1 disorder, depressed ([address]     Depression        Past Surgical History:   Procedure Laterality Date    HX ANKLE FRACTURE TX         Current Outpatient Medications   Medication Sig Dispense Refill    Invega Sustenna 234 mg/1.5 mL injection 234 mg every twenty-eight (28) days.  folic acid 0.8 mg cap Take 1 Tab by mouth daily.       levonorgestreL (MIRENA) 20 mcg/24 hours (6 yrs) 52 mg IUD by IntraUTERine route. No Known Allergies        PE:     Physical Exam  Vitals and nursing note reviewed. Constitutional:       General: She is not in acute distress. Appearance: Normal appearance. She is not ill-appearing, toxic-appearing or diaphoretic. HENT:      Head: Normocephalic and atraumatic. Nose: Nose normal.      Mouth/Throat:      Mouth: Mucous membranes are moist.   Eyes:      Extraocular Movements: Extraocular movements intact. Pupils: Pupils are equal, round, and reactive to light. Cardiovascular:      Pulses: Normal pulses. Pulmonary:      Effort: Pulmonary effort is normal. No respiratory distress. Abdominal:      General: Abdomen is flat. There is no distension. Musculoskeletal:         General: Swelling and tenderness present. No deformity or signs of injury. Normal range of motion. Cervical back: Normal range of motion and neck supple. Right lower leg: No edema. Left lower leg: No edema. Skin:     General: Skin is warm and dry. Capillary Refill: Capillary refill takes less than 2 seconds. Findings: No bruising or erythema. Neurological:      General: No focal deficit present. Mental Status: She is alert and oriented to person, place, and time. Psychiatric:         Mood and Affect: Mood normal.         Behavior: Behavior normal.          Wrist:    Tenderness L R Test L R   1st Ext Comp - + Finkelstein's - +   Snuff Box - - Herr - -   2nd Ext Comp - - S-L Shear - -   S-L Joint - - L-T Shear - -   L-T Joint - - DRUJ Sup - -   6th Ext Comp - - DRUJ Pro - -   Ulnar Snuff - - DRUJ Grind - -   Fovea - - TFCC - -   STT Joint - - Mid-Carp Inst - -   FCR - - P-T Grind - -   Intersection - - ECU Sublux.  - -      Dorsal Ganglion: -   Volar Ganglion: -      ROM: Full        Imagin/2022 3 views of right wrist does not show any fracture or dislocation or any other osseous abnormalities. ICD-10-CM ICD-9-CM    1. De Quervain's tenosynovitis, right  M65.4 727.04               Plan:     Proceed as scheduled for right wrist first dorsal compartment release    The patient was counseled at length about the risks of antoni Covid-19 during their perioperative period and any recovery window from their procedure. The patient was made aware that antoni Covid-19  may worsen their prognosis for recovering from their procedure and lend to a higher morbidity and/or mortality risk. All material risks, benefits, and reasonable alternatives including postponing the procedure were discussed. The patient does  wish to proceed with the procedure at this time. This procedure has been fully reviewed with the patient and written informed consent has been obtained. Follow-up and Dispositions    Return for postop.           Plan was reviewed with patient, who verbalized agreement and understanding of the plan

## 2023-01-27 NOTE — LETTER
1/27/2023    Patient: Martin Parada   YOB: 1985   Date of Visit: 1/27/2023     Adithya Mullen MD  [address] [facility].  Via Fax: [phone]    Dear Adithya Mullen MD,      Thank you for referring Ms. Martin Parada to [facility] for evaluation. My notes for this consultation are attached. If you have questions, please do not hesitate to call me. I look forward to following your patient along with you.       Sincerely,    Gilma Block, DO

## 2023-01-30 ENCOUNTER — ANESTHESIA EVENT (OUTPATIENT)
Dept: SURGERY | Age: 38
End: 2023-01-30
Payer: MEDICARE

## 2023-01-30 PROBLEM — M65.4 DE QUERVAIN'S TENOSYNOVITIS, RIGHT: Status: ACTIVE | Noted: 2023-01-30

## 2023-01-30 NOTE — DISCHARGE INSTRUCTIONS
Ice and Elevate operative wound/dressing. Begin moving fingers immediately after surgery. Keep dressing clean and dry. Cover for showering. Remove Dressing Friday, wash, dry, and leave open to air. DISCHARGE SUMMARY from Nurse    PATIENT INSTRUCTIONS:    After general anesthesia or intravenous sedation, for 24 hours or while taking prescription Narcotics:  Limit your activities  Do not drive and operate hazardous machinery  Do not make important personal or business decisions  Do  not drink alcoholic beverages  If you have not urinated within 8 hours after discharge, please contact your surgeon on call. Report the following to your surgeon:  Excessive pain, swelling, redness or odor of or around the surgical area  Temperature over 100.5  Nausea and vomiting lasting longer than 4 hours or if unable to take medications  Any signs of decreased circulation or nerve impairment to extremity: change in color, persistent  numbness, tingling, coldness or increase pain  Any questions      These are general instructions for a healthy lifestyle:    No smoking/ No tobacco products/ Avoid exposure to second hand smoke  Surgeon General's Warning:  Quitting smoking now greatly reduces serious risk to your health. Obesity, smoking, and sedentary lifestyle greatly increases your risk for illness    A healthy diet, regular physical exercise & weight monitoring are important for maintaining a healthy lifestyle    You may be retaining fluid if you have a history of heart failure or if you experience any of the following symptoms:  Weight gain of 3 pounds or more overnight or 5 pounds in a week, increased swelling in our hands or feet or shortness of breath while lying flat in bed. Please call your doctor as soon as you notice any of these symptoms; do not wait until your next office visit. The discharge information has been reviewed with the patient and mother.   The patient and mother verbalized understanding. Discharge medications reviewed with the patient and mother and appropriate educational materials and side effects teaching were provided.   ___________________________________________________________________________________________________________________________________

## 2023-01-30 NOTE — H&P
Ivan Beebe is a 43 y.o. female right handed unspecified employment. Worker's Compensation and legal considerations: none filed. Vitals:     01/27/23 1115   BP: 135/81   Pulse: 83   Temp: 97.4 °F (36.3 °C)   Weight: 286 lb 12.8 oz (130.1 kg)   PainSc:   7                   Chief Complaint   Patient presents with    Surg H&P         HPI: Patient presents today for preoperative visit scheduled for right first dorsal compartment release next week. She denies any changes. Date of onset: Indeterminate     Injury: No     Prior Treatment:  Yes: Comment: Right first dorsal compartment injection     Numbness/ Tingling: No        ROS: Review of Systems - General ROS: negative  Psychological ROS: negative  ENT ROS: negative  Allergy and Immunology ROS: negative  Hematological and Lymphatic ROS: negative  Respiratory ROS: no cough, shortness of breath, or wheezing  Cardiovascular ROS: no chest pain or dyspnea on exertion  Gastrointestinal ROS: no abdominal pain, change in bowel habits, or black or bloody stools  Musculoskeletal ROS: negative  Neurological ROS: negative  Dermatological ROS: negative          Past Medical History:   Diagnosis Date    Bipolar 1 disorder, depressed ([facility] [address]      Depression           Surgical History         Past Surgical History:   Procedure Laterality Date    HX ANKLE FRACTURE TX                       Current Outpatient Medications   Medication Sig Dispense Refill    Invega Sustenna 234 mg/1.5 mL injection 234 mg every twenty-eight (28) days. folic acid 0.8 mg cap Take 1 Tab by mouth daily. levonorgestreL (MIRENA) 20 mcg/24 hours (6 yrs) 52 mg IUD by IntraUTERine route. No Known Allergies           PE:      Physical Exam  Vitals and nursing note reviewed. Constitutional:       General: She is not in acute distress. Appearance: Normal appearance. She is not ill-appearing, toxic-appearing or diaphoretic. HENT:      Head: Normocephalic and atraumatic. Nose: Nose normal.      Mouth/Throat:      Mouth: Mucous membranes are moist.   Eyes:      Extraocular Movements: Extraocular movements intact. Pupils: Pupils are equal, round, and reactive to light. Cardiovascular:      Pulses: Normal pulses. Pulmonary:      Effort: Pulmonary effort is normal. No respiratory distress. Abdominal:      General: Abdomen is flat. There is no distension. Musculoskeletal:         General: Swelling and tenderness present. No deformity or signs of injury. Normal range of motion. Cervical back: Normal range of motion and neck supple. Right lower leg: No edema. Left lower leg: No edema. Skin:     General: Skin is warm and dry. Capillary Refill: Capillary refill takes less than 2 seconds. Findings: No bruising or erythema. Neurological:      General: No focal deficit present. Mental Status: She is alert and oriented to person, place, and time. Psychiatric:         Mood and Affect: Mood normal.         Behavior: Behavior normal.            Wrist:     Tenderness L R Test L R   1st Ext Comp - + Finkelstein's - +   Snuff Box - - Herr - -   2nd Ext Comp - - S-L Shear - -   S-L Joint - - L-T Shear - -   L-T Joint - - DRUJ Sup - -   6th Ext Comp - - DRUJ Pro - -   Ulnar Snuff - - DRUJ Grind - -   Fovea - - TFCC - -   STT Joint - - Mid-Carp Inst - -   FCR - - P-T Grind - -   Intersection - - ECU Sublux. - -                  Dorsal Ganglion: -              Volar Ganglion: -                            ROM: Full           Imagin/2022 3 views of right wrist does not show any fracture or dislocation or any other osseous abnormalities.             ICD-10-CM ICD-9-CM     1. De Quervain's tenosynovitis, right  M65.4 727.04                     Plan:      Proceed as scheduled for right wrist first dorsal compartment release     The patient was counseled at length about the risks of antoni Covid-19 during their perioperative period and any recovery window from their procedure. The patient was made aware that antoni Covid-19  may worsen their prognosis for recovering from their procedure and lend to a higher morbidity and/or mortality risk. All material risks, benefits, and reasonable alternatives including postponing the procedure were discussed. The patient does  wish to proceed with the procedure at this time. This procedure has been fully reviewed with the patient and written informed consent has been obtained. Follow-up and Dispositions    Return for postop.             Plan was reviewed with patient, who verbalized agreement and understanding of the plan

## 2023-01-31 ENCOUNTER — ANESTHESIA (OUTPATIENT)
Dept: SURGERY | Age: 38
End: 2023-01-31
Payer: MEDICARE

## 2023-01-31 ENCOUNTER — HOSPITAL ENCOUNTER (OUTPATIENT)
Age: 38
Setting detail: OUTPATIENT SURGERY
Discharge: HOME | End: 2023-01-31
Attending: ORTHOPAEDIC SURGERY | Admitting: ORTHOPAEDIC SURGERY
Payer: MEDICARE

## 2023-01-31 VITALS
HEART RATE: 83 BPM | HEIGHT: 68 IN | DIASTOLIC BLOOD PRESSURE: 90 MMHG | SYSTOLIC BLOOD PRESSURE: 134 MMHG | OXYGEN SATURATION: 97 % | RESPIRATION RATE: 18 BRPM | TEMPERATURE: 98 F | WEIGHT: 289 LBS | BODY MASS INDEX: 43.8 KG/M2

## 2023-01-31 DIAGNOSIS — M65.4 DE QUERVAIN'S TENOSYNOVITIS, RIGHT: Primary | ICD-10-CM

## 2023-01-31 LAB
AMPHET UR QL SCN: NEGATIVE
BARBITURATES UR QL SCN: NEGATIVE
BENZODIAZ UR QL: NEGATIVE
CANNABINOIDS UR QL SCN: NEGATIVE
COCAINE UR QL SCN: NEGATIVE
COVID-19 RAPID TEST, COVR: NOT DETECTED
HCG UR QL: NEGATIVE
HCG UR QL: NEGATIVE
HDSCOM,HDSCOM: NORMAL
METHADONE UR QL: NEGATIVE
OPIATES UR QL: NEGATIVE
PCP UR QL: NEGATIVE
SOURCE, COVRS: NORMAL

## 2023-01-31 PROCEDURE — 77030040922 HC BLNKT HYPOTHRM STRY -A: Performed by: ORTHOPAEDIC SURGERY

## 2023-01-31 PROCEDURE — 74011000272 HC RX REV CODE- 272: Performed by: ORTHOPAEDIC SURGERY

## 2023-01-31 PROCEDURE — 01810 ANES PX NRV MUSC F/ARM WRST: CPT | Performed by: NURSE ANESTHETIST, CERTIFIED REGISTERED

## 2023-01-31 PROCEDURE — 76210000006 HC OR PH I REC 0.5 TO 1 HR: Performed by: ORTHOPAEDIC SURGERY

## 2023-01-31 PROCEDURE — 74011250637 HC RX REV CODE- 250/637: Performed by: NURSE ANESTHETIST, CERTIFIED REGISTERED

## 2023-01-31 PROCEDURE — 25000 INCISION OF TENDON SHEATH: CPT | Performed by: ORTHOPAEDIC SURGERY

## 2023-01-31 PROCEDURE — 76010000138 HC OR TIME 0.5 TO 1 HR: Performed by: ORTHOPAEDIC SURGERY

## 2023-01-31 PROCEDURE — 74011250636 HC RX REV CODE- 250/636: Performed by: ORTHOPAEDIC SURGERY

## 2023-01-31 PROCEDURE — 80307 DRUG TEST PRSMV CHEM ANLYZR: CPT

## 2023-01-31 PROCEDURE — 77030010813: Performed by: ORTHOPAEDIC SURGERY

## 2023-01-31 PROCEDURE — 2709999900 HC NON-CHARGEABLE SUPPLY: Performed by: ORTHOPAEDIC SURGERY

## 2023-01-31 PROCEDURE — 77030000032 HC CUF TRNQT ZIMM -B: Performed by: ORTHOPAEDIC SURGERY

## 2023-01-31 PROCEDURE — 77030040361 HC SLV COMPR DVT MDII -B: Performed by: ORTHOPAEDIC SURGERY

## 2023-01-31 PROCEDURE — 76210000026 HC REC RM PH II 1 TO 1.5 HR: Performed by: ORTHOPAEDIC SURGERY

## 2023-01-31 PROCEDURE — 77030040356 HC CORD BPLR FRCP COVD -A: Performed by: ORTHOPAEDIC SURGERY

## 2023-01-31 PROCEDURE — 77030006689 HC BLD OPHTH BVR BD -A: Performed by: ORTHOPAEDIC SURGERY

## 2023-01-31 PROCEDURE — 81025 URINE PREGNANCY TEST: CPT

## 2023-01-31 PROCEDURE — 74011000250 HC RX REV CODE- 250: Performed by: ORTHOPAEDIC SURGERY

## 2023-01-31 PROCEDURE — 77030002888 HC SUT CHRMC J&J -A: Performed by: ORTHOPAEDIC SURGERY

## 2023-01-31 PROCEDURE — 01810 ANES PX NRV MUSC F/ARM WRST: CPT | Performed by: STUDENT IN AN ORGANIZED HEALTH CARE EDUCATION/TRAINING PROGRAM

## 2023-01-31 PROCEDURE — 74011250636 HC RX REV CODE- 250/636: Performed by: NURSE ANESTHETIST, CERTIFIED REGISTERED

## 2023-01-31 PROCEDURE — 76060000032 HC ANESTHESIA 0.5 TO 1 HR: Performed by: ORTHOPAEDIC SURGERY

## 2023-01-31 PROCEDURE — 87635 SARS-COV-2 COVID-19 AMP PRB: CPT

## 2023-01-31 RX ORDER — SODIUM CHLORIDE 0.9 % (FLUSH) 0.9 %
5-40 SYRINGE (ML) INJECTION AS NEEDED
Status: DISCONTINUED | OUTPATIENT
Start: 2023-01-31 | End: 2023-01-31 | Stop reason: HOSPADM

## 2023-01-31 RX ORDER — FAMOTIDINE 20 MG/1
20 TABLET, FILM COATED ORAL ONCE
Status: COMPLETED | OUTPATIENT
Start: 2023-01-31 | End: 2023-01-31

## 2023-01-31 RX ORDER — ONDANSETRON 2 MG/ML
4 INJECTION INTRAMUSCULAR; INTRAVENOUS
Status: CANCELLED | OUTPATIENT
Start: 2023-01-31 | End: 2023-02-01

## 2023-01-31 RX ORDER — LIDOCAINE HYDROCHLORIDE 10 MG/ML
0.1 INJECTION, SOLUTION EPIDURAL; INFILTRATION; INTRACAUDAL; PERINEURAL AS NEEDED
Status: DISCONTINUED | OUTPATIENT
Start: 2023-01-31 | End: 2023-01-31 | Stop reason: HOSPADM

## 2023-01-31 RX ORDER — MIDAZOLAM HYDROCHLORIDE 1 MG/ML
INJECTION, SOLUTION INTRAMUSCULAR; INTRAVENOUS AS NEEDED
Status: DISCONTINUED | OUTPATIENT
Start: 2023-01-31 | End: 2023-01-31 | Stop reason: HOSPADM

## 2023-01-31 RX ORDER — SODIUM CHLORIDE, SODIUM LACTATE, POTASSIUM CHLORIDE, CALCIUM CHLORIDE 600; 310; 30; 20 MG/100ML; MG/100ML; MG/100ML; MG/100ML
25 INJECTION, SOLUTION INTRAVENOUS CONTINUOUS
Status: DISCONTINUED | OUTPATIENT
Start: 2023-01-31 | End: 2023-01-31 | Stop reason: HOSPADM

## 2023-01-31 RX ORDER — HYDROCODONE BITARTRATE AND ACETAMINOPHEN 5; 325 MG/1; MG/1
1 TABLET ORAL
Qty: 12 TABLET | Refills: 0 | Status: SHIPPED | OUTPATIENT
Start: 2023-01-31 | End: 2023-02-03

## 2023-01-31 RX ORDER — PROPOFOL 10 MG/ML
VIAL (ML) INTRAVENOUS
Status: DISCONTINUED | OUTPATIENT
Start: 2023-01-31 | End: 2023-01-31 | Stop reason: HOSPADM

## 2023-01-31 RX ORDER — SODIUM CHLORIDE, SODIUM LACTATE, POTASSIUM CHLORIDE, CALCIUM CHLORIDE 600; 310; 30; 20 MG/100ML; MG/100ML; MG/100ML; MG/100ML
75 INJECTION, SOLUTION INTRAVENOUS CONTINUOUS
Status: CANCELLED | OUTPATIENT
Start: 2023-01-31

## 2023-01-31 RX ORDER — SODIUM CHLORIDE 0.9 % (FLUSH) 0.9 %
5-40 SYRINGE (ML) INJECTION EVERY 8 HOURS
Status: DISCONTINUED | OUTPATIENT
Start: 2023-01-31 | End: 2023-01-31 | Stop reason: HOSPADM

## 2023-01-31 RX ORDER — HYDROMORPHONE HYDROCHLORIDE 2 MG/ML
0.5 INJECTION, SOLUTION INTRAMUSCULAR; INTRAVENOUS; SUBCUTANEOUS
Status: CANCELLED | OUTPATIENT
Start: 2023-01-31

## 2023-01-31 RX ORDER — FENTANYL CITRATE 50 UG/ML
INJECTION, SOLUTION INTRAMUSCULAR; INTRAVENOUS AS NEEDED
Status: DISCONTINUED | OUTPATIENT
Start: 2023-01-31 | End: 2023-01-31 | Stop reason: HOSPADM

## 2023-01-31 RX ORDER — DICLOFENAC SODIUM 75 MG/1
75 TABLET, DELAYED RELEASE ORAL 2 TIMES DAILY WITH MEALS
Qty: 14 TABLET | Refills: 0 | Status: SHIPPED | OUTPATIENT
Start: 2023-01-31 | End: 2023-02-07

## 2023-01-31 RX ORDER — PROPOFOL 10 MG/ML
INJECTION, EMULSION INTRAVENOUS AS NEEDED
Status: DISCONTINUED | OUTPATIENT
Start: 2023-01-31 | End: 2023-01-31 | Stop reason: HOSPADM

## 2023-01-31 RX ADMIN — FENTANYL CITRATE 50 MCG: 50 INJECTION, SOLUTION INTRAMUSCULAR; INTRAVENOUS at 10:27

## 2023-01-31 RX ADMIN — CEFAZOLIN SODIUM 2 G: 1 INJECTION, POWDER, FOR SOLUTION INTRAMUSCULAR; INTRAVENOUS at 10:35

## 2023-01-31 RX ADMIN — MIDAZOLAM HYDROCHLORIDE 2 MG: 2 INJECTION, SOLUTION INTRAMUSCULAR; INTRAVENOUS at 10:21

## 2023-01-31 RX ADMIN — FENTANYL CITRATE 50 MCG: 50 INJECTION, SOLUTION INTRAMUSCULAR; INTRAVENOUS at 10:41

## 2023-01-31 RX ADMIN — PROPOFOL 100 MG: 10 INJECTION, EMULSION INTRAVENOUS at 10:29

## 2023-01-31 RX ADMIN — FENTANYL CITRATE 50 MCG: 50 INJECTION, SOLUTION INTRAMUSCULAR; INTRAVENOUS at 10:33

## 2023-01-31 RX ADMIN — FENTANYL CITRATE 50 MCG: 50 INJECTION, SOLUTION INTRAMUSCULAR; INTRAVENOUS at 10:21

## 2023-01-31 RX ADMIN — FAMOTIDINE 20 MG: 20 TABLET, FILM COATED ORAL at 09:27

## 2023-01-31 RX ADMIN — PROPOFOL 60 MCG/KG/MIN: 10 INJECTION, EMULSION INTRAVENOUS at 10:29

## 2023-01-31 RX ADMIN — SODIUM CHLORIDE, POTASSIUM CHLORIDE, SODIUM LACTATE AND CALCIUM CHLORIDE 25 ML/HR: 600; 310; 30; 20 INJECTION, SOLUTION INTRAVENOUS at 09:10

## 2023-01-31 NOTE — BRIEF OP NOTE
Brief Postoperative Note    Patient: Keith Lackey  YOB: 1985  MRN: [id number]    Date of Procedure: 1/31/2023     Pre-Op Diagnosis: Abi Ast Quervain's tenosynovitis, right [M65.4]    Post-Op Diagnosis: Same as preoperative diagnosis. Procedure(s):  RIGHT FIRST DORSAL COMPARTMENT RELEASE    Surgeon(s):   Harmony Ariza DO    Surgical Assistant: None    Anesthesia: MAC     Estimated Blood Loss (mL): Minimal    Complications: None    Specimens: * No specimens in log *     Implants: * No implants in log *    Drains: * No LDAs found *    Findings: thickened inflamed 1st DC    Electronically Signed by Sheri Lee DO on 1/31/2023 at 10:54 AM

## 2023-01-31 NOTE — OP NOTES
Operative Report    Patient: Jourdan Varghese MRN: [id number]  SSN: [id number]    YOB: 1985  Age: 43 y.o. Sex: female       Date of Surgery: 1/31/2023     Preoperative Diagnosis: De Quervain's tenosynovitis, right [M65.4]     Postoperative Diagnosis: De Quervain's tenosynovitis, right [M65.4]     Surgeon(s) and Role:     Brendan Tidwell,  - Primary    Assistant: None    Anesthesia: MAC + Local    Procedure: Procedure(s):  RIGHT FIRST DORSAL COMPARTMENT RELEASE     Findings: Thickened inflamed first dorsal compartment with tenosynovitis around tendons. Procedure in Detail:     Indications for procedure been outlined in the perioperative documentation, most notably being not amenable to conservative treatment. Informed consent was obtained from the patient. The risks and benefits of the procedure were discussed with the patient. They include but are not limited to neurovascular injury, tendon/ligamentous injury, blood loss, infection, need for further surgery, hematoma, neuroma, seroma, chronic pain, chronic stiffness, complications from anesthesia including death, and the possibility of antoni Covid. After informed consent was obtained, the patient was taken back to the operative suite. A tourniquet was applied to the operative extremity and it was prepped and draped in the normal sterile fashion. The arm was exsanguinated and the tourniquet was elevated to 250 mmHg. A longitudinal incision was made over the first dorsal compartment. The subcutaneous tissues were dissected and electrocautery was used for hemostasis. At this point the superficial branch of the radial nerve was identified and protected. The first dorsal compartment was identified and an incision was made centrally in the compartment. The compartment was released up to the level of its insertion at the first metacarpal base.   At this point attention was turned proximally where it was released up to the level of the musculotendinous junction. Additionally as indicated any subsheaths that were identified were released. Additionally any synovium that appeared problematic was excised. Care was taken to cauterize any vasculature within the synovium. The wound was copiously irrigated and if not already done so prior to the procedure quarter percent Marcaine plain was injected into the subcutaneous tissues and the deep tissues. The tourniquet was let down electrocautery was used for hemostasis of any remaining bleeders. The wound was closed with 4-0 Monocryl and dermal glue at the end of the procedure. Prior to prepping and draping 6 mL of quarter percent Marcaine plain mixed with 1% lidocaine plain was injected into the deep and superficial tissues surrounding the first dorsal compartment. The operative field was cleansed and dried and placed into a sterile dressing, and splint if indicated. The patient was sent to recovery in stable condition and given appropriate wound care instructions, follow-up with me in the outpatient setting, and a prescription for pain medicine. Estimated Blood Loss:  minimal    Tourniquet Time:   Total Tourniquet Time Documented:  Upper Arm (Right) - 6 minutes  Total: Upper Arm (Right) - 6 minutes        Implants: * No implants in log *            Specimens: * No specimens in log *        Drains: None                Complications: None    Counts: Sponge and needle counts were correct times two.     Signed By:  David Chambers DO     January 31, 2023

## 2023-01-31 NOTE — H&P
Update History & Physical    The Patient's History and Physical of January 27, 2023 was reviewed with the patient and I examined the patient. There was no change. The surgical site was confirmed by the patient and me. Plan:  The risk, benefits, expected outcome, and alternative to the recommended procedure have been discussed with the patient. Patient understands and wants to proceed with the procedure.     Electronically signed by Kaylan Clemons DO on 1/31/2023 at 9:04 AM

## 2023-01-31 NOTE — ANESTHESIA PREPROCEDURE EVALUATION
Relevant Problems   No relevant active problems       Anesthetic History   No history of anesthetic complications            Review of Systems / Medical History  Patient summary reviewed, nursing notes reviewed and pertinent labs reviewed    Pulmonary  Within defined limits                 Neuro/Psych         Psychiatric history     Cardiovascular  Within defined limits                     GI/Hepatic/Renal  Within defined limits              Endo/Other        Morbid obesity     Other Findings              Physical Exam    Airway  Mallampati: III  TM Distance: 4 - 6 cm  Neck ROM: normal range of motion   Mouth opening: Normal     Cardiovascular  Regular rate and rhythm,  S1 and S2 normal,  no murmur, click, rub, or gallop  Rhythm: regular  Rate: normal         Dental  No notable dental hx       Pulmonary  Breath sounds clear to auscultation               Abdominal  GI exam deferred       Other Findings            Anesthetic Plan    ASA: 3  Anesthesia type: MAC and general - backup          Induction: Intravenous  Anesthetic plan and risks discussed with: Patient

## 2023-01-31 NOTE — ANESTHESIA POSTPROCEDURE EVALUATION
Procedure(s):  RIGHT FIRST DORSAL COMPARTMENT RELEASE.    MAC, general - backup    Anesthesia Post Evaluation      Multimodal analgesia: multimodal analgesia used between 6 hours prior to anesthesia start to PACU discharge  Patient location during evaluation: PACU  Patient participation: complete - patient participated  Level of consciousness: sleepy but conscious  Pain management: adequate  Airway patency: patent  Anesthetic complications: no  Cardiovascular status: acceptable  Respiratory status: acceptable  Hydration status: acceptable  Post anesthesia nausea and vomiting:  controlled  Final Post Anesthesia Temperature Assessment:  Normothermia (36.0-37.5 degrees C)      INITIAL Post-op Vital signs: No vitals data found for the desired time range.

## 2023-03-24 ENCOUNTER — OFFICE VISIT (OUTPATIENT)
Age: 38
End: 2023-03-24

## 2023-03-24 VITALS — BODY MASS INDEX: 42.59 KG/M2 | WEIGHT: 281 LBS | HEIGHT: 68 IN | RESPIRATION RATE: 18 BRPM

## 2023-03-24 DIAGNOSIS — Z98.890 STATUS POST DE QUERVAIN'S RELEASE SURGERY: ICD-10-CM

## 2023-03-24 DIAGNOSIS — M65.4 DE QUERVAIN'S TENOSYNOVITIS, RIGHT: Primary | ICD-10-CM

## 2023-03-24 PROCEDURE — 99024 POSTOP FOLLOW-UP VISIT: CPT | Performed by: ORTHOPAEDIC SURGERY

## 2023-03-24 NOTE — PROGRESS NOTES
Known Allergies      Resp 18   Ht 5' 8\" (1.727 m)   Wt 281 lb (127.5 kg)   BMI 42.73 kg/m²   Physical Exam  Vitals and nursing note reviewed. Constitutional:       General: She is not in acute distress. Appearance: Normal appearance. She is not ill-appearing. Cardiovascular:      Pulses: Normal pulses. Pulmonary:      Effort: Pulmonary effort is normal. No respiratory distress. Musculoskeletal:         General: Tenderness present. No swelling, deformity or signs of injury. Normal range of motion. Cervical back: Normal range of motion and neck supple. Right lower leg: No edema. Left lower leg: No edema. Skin:     General: Skin is warm and dry. Capillary Refill: Capillary refill takes less than 2 seconds. Findings: No bruising or erythema. Neurological:      General: No focal deficit present. Mental Status: She is alert and oriented to person, place, and time. Psychiatric:         Mood and Affect: Mood normal.         Behavior: Behavior normal.          Right wrist: Wound healed, no erythema, drainage, or signs of infection. There is continued tenderness around the incision site. Imagin/2022 3 views of right wrist does not show any fracture or dislocation or any other osseous abnormalities. Impression     Diagnosis Orders   1. De Quervain's tenosynovitis, right  External Referral To Occupational Therapy      2. Status post de Quervain's release surgery  External Referral To Occupational Therapy            Plan:     Referral to Occupational Therapy for range of motion exercises, edema control, and other modalities. Return if symptoms worsen or fail to improve. Plan was reviewed with patient, who verbalized agreement and understanding of the plan    Note: This note was completed using voice recognition software.   Any typographical/name errors or mistakes are unintentional.

## 2023-08-08 DIAGNOSIS — M25.572 LEFT ANKLE PAIN, UNSPECIFIED CHRONICITY: Primary | ICD-10-CM

## 2024-07-24 ENCOUNTER — OFFICE VISIT (OUTPATIENT)
Age: 39
End: 2024-07-24
Payer: MEDICARE

## 2024-07-24 VITALS — WEIGHT: 260 LBS | BODY MASS INDEX: 39.4 KG/M2 | HEIGHT: 68 IN

## 2024-07-24 DIAGNOSIS — Z98.890 STATUS POST DE QUERVAIN'S RELEASE SURGERY: ICD-10-CM

## 2024-07-24 DIAGNOSIS — M65.4 DE QUERVAIN'S TENOSYNOVITIS, RIGHT: Primary | ICD-10-CM

## 2024-07-24 PROCEDURE — 99213 OFFICE O/P EST LOW 20 MIN: CPT | Performed by: ORTHOPAEDIC SURGERY

## 2024-07-24 RX ORDER — DICLOFENAC SODIUM 75 MG/1
75 TABLET, DELAYED RELEASE ORAL 2 TIMES DAILY
Qty: 60 TABLET | Refills: 0 | Status: SHIPPED | OUTPATIENT
Start: 2024-07-24 | End: 2024-08-23

## 2024-07-24 NOTE — PROGRESS NOTES
- - [facility] S[facility]. - -      Dorsal Ganglion: -   Volar Ganglion: -      ROM: Full        Imagin/2022 3 views of right wrist does not show any fracture or dislocation or any other osseous abnormalities.      Impression     Diagnosis Orders   1. De Quervain's tenosynovitis, right  External Referral To Occupational Therapy    diclofenac (VOLTAREN) 75 MG EC tablet      2. Status post de Quervain's release surgery  External Referral To Occupational Therapy    diclofenac (VOLTAREN) 75 MG EC tablet            Plan:     Referral to Occupational Therapy for range of motion exercises, edema control, and other modalities.    Diclofenac prescribed to help with acute inflammation.  We discussed possibility of injection in the future if her symptoms do not resolve.    Return if symptoms worsen or fail to improve.     Plan was reviewed with patient, who verbalized agreement and understanding of the plan    Note: This note was completed using voice recognition software.  Any typographical/name errors or mistakes are unintentional.

## 2024-08-21 DIAGNOSIS — Z98.890 STATUS POST DE QUERVAIN'S RELEASE SURGERY: ICD-10-CM

## 2024-08-21 DIAGNOSIS — M65.4 DE QUERVAIN'S TENOSYNOVITIS, RIGHT: ICD-10-CM

## 2024-08-21 RX ORDER — DICLOFENAC SODIUM 75 MG/1
75 TABLET, DELAYED RELEASE ORAL 2 TIMES DAILY
Qty: 60 TABLET | Refills: 0 | Status: SHIPPED | OUTPATIENT
Start: 2024-08-21

## 2024-09-13 ENCOUNTER — OFFICE VISIT (OUTPATIENT)
Age: 39
End: 2024-09-13

## 2024-09-13 VITALS — HEIGHT: 68 IN | WEIGHT: 261 LBS | BODY MASS INDEX: 39.56 KG/M2

## 2024-09-13 DIAGNOSIS — M17.32 POST-TRAUMATIC OSTEOARTHRITIS OF LEFT KNEE: ICD-10-CM

## 2024-09-13 DIAGNOSIS — Z87.81 HISTORY OF TIBIAL FRACTURE: Primary | ICD-10-CM

## 2024-09-13 RX ORDER — DICLOFENAC SODIUM 75 MG/1
75 TABLET, DELAYED RELEASE ORAL 2 TIMES DAILY
Qty: 60 TABLET | Refills: 0 | Status: SHIPPED | OUTPATIENT
Start: 2024-09-13

## 2024-09-27 DIAGNOSIS — M17.32 POST-TRAUMATIC OSTEOARTHRITIS OF LEFT KNEE: ICD-10-CM

## 2024-09-27 RX ORDER — DICLOFENAC SODIUM 75 MG/1
75 TABLET, DELAYED RELEASE ORAL 2 TIMES DAILY
Qty: 60 TABLET | Refills: 0 | Status: SHIPPED | OUTPATIENT
Start: 2024-09-27

## 2024-10-29 DIAGNOSIS — M17.32 POST-TRAUMATIC OSTEOARTHRITIS OF LEFT KNEE: ICD-10-CM

## 2024-10-30 RX ORDER — DICLOFENAC SODIUM 75 MG/1
75 TABLET, DELAYED RELEASE ORAL 2 TIMES DAILY
Qty: 60 TABLET | Refills: 0 | Status: SHIPPED | OUTPATIENT
Start: 2024-10-30

## (undated) DEVICE — GOWN CHEMO 2XL BLU POLY MULTILAYER COAT ISOLATN W HK LOOP

## (undated) DEVICE — PACK PROCEDURE SURG MAJ W/ BASIN LF

## (undated) DEVICE — GARMENT,MEDLINE,DVT,INT,CALF,FOAM,MED: Brand: MEDLINE

## (undated) DEVICE — GLOVE SURG SZ 8 CRM LTX FREE POLYISOPRENE POLYMER BEAD ANTI

## (undated) DEVICE — GAUZE,SPONGE,4"X4",12PLY,STERILE,LF,2'S: Brand: MEDLINE

## (undated) DEVICE — BLADE OPHTH GRN ROUNDED TIP 1 SIDE SHRP GRINDLESS MINI-BLDE

## (undated) DEVICE — Device

## (undated) DEVICE — SYR 10ML LUER LOK 1/5ML GRAD --

## (undated) DEVICE — DRAPE,REIN 53X77,STERILE: Brand: MEDLINE

## (undated) DEVICE — DRAPE,HAND,STERILE: Brand: MEDLINE

## (undated) DEVICE — BLANKET WRM AD PREM MISTRAL-AIR

## (undated) DEVICE — BNDG ELAS HK LOOP 3X5YD NS -- MATRIX

## (undated) DEVICE — DRESSING,GAUZE,XEROFORM,CURAD,1"X8",ST: Brand: CURAD

## (undated) DEVICE — CANNULA PERF L2IN BLNT TIP 2MM VES CLR RADPQ BODY FEM LUER

## (undated) DEVICE — INTENDED FOR TISSUE SEPARATION, AND OTHER PROCEDURES THAT REQUIRE A SHARP SURGICAL BLADE TO PUNCTURE OR CUT.: Brand: BARD-PARKER ®  SAFETY SCALPED

## (undated) DEVICE — ZIMMER® STERILE DISPOSABLE TOURNIQUET CUFF WITH PROTECTIVE SLEEVE AND PLC, DUAL PORT, SINGLE BLADDER, 18 IN. (46 CM)

## (undated) DEVICE — BANDAGE,ELASTIC,ESMARK,STERILE,4"X9',LF: Brand: MEDLINE

## (undated) DEVICE — SYR LR LCK 1ML GRAD NSAF 30ML --

## (undated) DEVICE — PREP SKN CHLRAPRP APL 26ML STR --

## (undated) DEVICE — SUT CHRMC 4-0 18IN PS2 BRN --

## (undated) DEVICE — BIPOLAR FORCEPS CORD: Brand: VALLEYLAB

## (undated) DEVICE — KIT CLN UP BON SECOURS MARYV